# Patient Record
Sex: FEMALE | Race: WHITE | Employment: PART TIME | URBAN - METROPOLITAN AREA
[De-identification: names, ages, dates, MRNs, and addresses within clinical notes are randomized per-mention and may not be internally consistent; named-entity substitution may affect disease eponyms.]

---

## 2018-06-14 ENCOUNTER — HOSPITAL ENCOUNTER (EMERGENCY)
Age: 53
Discharge: HOME | End: 2018-06-15
Payer: COMMERCIAL

## 2018-06-14 DIAGNOSIS — S61.210A LACERATION OF RIGHT INDEX FINGER WITHOUT FOREIGN BODY WITHOUT DAMAGE TO NAIL, INITIAL ENCOUNTER: Primary | ICD-10-CM

## 2018-06-14 DIAGNOSIS — R51.9 NONINTRACTABLE HEADACHE, UNSPECIFIED CHRONICITY PATTERN, UNSPECIFIED HEADACHE TYPE: ICD-10-CM

## 2018-06-14 DIAGNOSIS — Z23 NEED FOR TDAP VACCINATION: ICD-10-CM

## 2018-06-14 DIAGNOSIS — I10 ESSENTIAL HYPERTENSION: ICD-10-CM

## 2018-06-14 LAB
ANION GAP SERPL CALCULATED.3IONS-SCNC: 13 MMOL/L (ref 7–16)
BUN BLDV-MCNC: 17 MG/DL (ref 6–20)
CALCIUM SERPL-MCNC: 8.9 MG/DL (ref 8.6–10.2)
CHLORIDE BLD-SCNC: 101 MMOL/L (ref 98–107)
CHP ED QC CHECK: YES
CO2: 23 MMOL/L (ref 22–29)
CREAT SERPL-MCNC: 0.9 MG/DL (ref 0.5–1)
GFR AFRICAN AMERICAN: >60
GFR NON-AFRICAN AMERICAN: >60 ML/MIN/1.73
GLUCOSE BLD-MCNC: 87 MG/DL (ref 74–109)
HCT VFR BLD CALC: 39.7 % (ref 34–48)
HEMOGLOBIN: 13.3 G/DL (ref 11.5–15.5)
MCH RBC QN AUTO: 31.4 PG (ref 26–35)
MCHC RBC AUTO-ENTMCNC: 33.5 % (ref 32–34.5)
MCV RBC AUTO: 93.6 FL (ref 80–99.9)
PDW BLD-RTO: 12.5 FL (ref 11.5–15)
PLATELET # BLD: 249 E9/L (ref 130–450)
PMV BLD AUTO: 11 FL (ref 7–12)
POTASSIUM SERPL-SCNC: 3.7 MMOL/L (ref 3.5–5)
PREGNANCY TEST URINE, POC: NEGATIVE
RBC # BLD: 4.24 E12/L (ref 3.5–5.5)
SODIUM BLD-SCNC: 137 MMOL/L (ref 132–146)
WBC # BLD: 7.7 E9/L (ref 4.5–11.5)

## 2018-06-14 PROCEDURE — 90715 TDAP VACCINE 7 YRS/> IM: CPT | Performed by: NURSE PRACTITIONER

## 2018-06-14 PROCEDURE — 6370000000 HC RX 637 (ALT 250 FOR IP): Performed by: NURSE PRACTITIONER

## 2018-06-14 PROCEDURE — 2500000003 HC RX 250 WO HCPCS: Performed by: NURSE PRACTITIONER

## 2018-06-14 PROCEDURE — 96374 THER/PROPH/DIAG INJ IV PUSH: CPT

## 2018-06-14 PROCEDURE — 85027 COMPLETE CBC AUTOMATED: CPT

## 2018-06-14 PROCEDURE — 90471 IMMUNIZATION ADMIN: CPT | Performed by: NURSE PRACTITIONER

## 2018-06-14 PROCEDURE — 12001 RPR S/N/AX/GEN/TRNK 2.5CM/<: CPT

## 2018-06-14 PROCEDURE — 80048 BASIC METABOLIC PNL TOTAL CA: CPT

## 2018-06-14 PROCEDURE — 99284 EMERGENCY DEPT VISIT MOD MDM: CPT

## 2018-06-14 PROCEDURE — 2580000003 HC RX 258: Performed by: NURSE PRACTITIONER

## 2018-06-14 PROCEDURE — 6360000002 HC RX W HCPCS: Performed by: NURSE PRACTITIONER

## 2018-06-14 RX ORDER — LIDOCAINE HYDROCHLORIDE 10 MG/ML
20 INJECTION, SOLUTION INFILTRATION; PERINEURAL ONCE
Status: COMPLETED | OUTPATIENT
Start: 2018-06-14 | End: 2018-06-14

## 2018-06-14 RX ORDER — METOPROLOL SUCCINATE 25 MG/1
25 TABLET, EXTENDED RELEASE ORAL DAILY
COMMUNITY
End: 2019-08-05

## 2018-06-14 RX ORDER — HYDRALAZINE HYDROCHLORIDE 20 MG/ML
10 INJECTION INTRAMUSCULAR; INTRAVENOUS ONCE
Status: COMPLETED | OUTPATIENT
Start: 2018-06-14 | End: 2018-06-14

## 2018-06-14 RX ORDER — LEVONORGESTREL / ETHINYL ESTRADIOL AND ETHINYL ESTRADIOL 150-30(84)
KIT ORAL
COMMUNITY
End: 2019-08-05

## 2018-06-14 RX ORDER — DIAPER,BRIEF,INFANT-TODD,DISP
EACH MISCELLANEOUS ONCE
Status: COMPLETED | OUTPATIENT
Start: 2018-06-14 | End: 2018-06-14

## 2018-06-14 RX ADMIN — HYDRALAZINE HYDROCHLORIDE 10 MG: 20 INJECTION INTRAMUSCULAR; INTRAVENOUS at 22:40

## 2018-06-14 RX ADMIN — BACITRACIN: 500 OINTMENT TOPICAL at 22:39

## 2018-06-14 RX ADMIN — LIDOCAINE HYDROCHLORIDE 20 ML: 10 INJECTION, SOLUTION INFILTRATION; PERINEURAL at 22:39

## 2018-06-14 RX ADMIN — TETANUS TOXOID, REDUCED DIPHTHERIA TOXOID AND ACELLULAR PERTUSSIS VACCINE, ADSORBED 0.5 ML: 5; 2.5; 8; 8; 2.5 SUSPENSION INTRAMUSCULAR at 22:40

## 2018-06-14 RX ADMIN — DEXTROSE AND SODIUM CHLORIDE 500 ML: 5; 900 INJECTION, SOLUTION INTRAVENOUS at 22:41

## 2018-06-14 ASSESSMENT — PAIN DESCRIPTION - FREQUENCY: FREQUENCY: CONTINUOUS

## 2018-06-14 ASSESSMENT — PAIN DESCRIPTION - PAIN TYPE: TYPE: ACUTE PAIN

## 2018-06-14 ASSESSMENT — PAIN DESCRIPTION - ORIENTATION: ORIENTATION: RIGHT

## 2018-06-14 ASSESSMENT — PAIN SCALES - GENERAL
PAINLEVEL_OUTOF10: 10
PAINLEVEL_OUTOF10: 10

## 2018-06-14 ASSESSMENT — PAIN DESCRIPTION - LOCATION: LOCATION: HEAD

## 2018-06-14 ASSESSMENT — PAIN DESCRIPTION - PROGRESSION: CLINICAL_PROGRESSION: GRADUALLY WORSENING

## 2018-06-14 ASSESSMENT — PAIN DESCRIPTION - DESCRIPTORS: DESCRIPTORS: ACHING

## 2018-06-14 ASSESSMENT — PAIN DESCRIPTION - ONSET: ONSET: GRADUAL

## 2018-06-15 VITALS
TEMPERATURE: 98.6 F | SYSTOLIC BLOOD PRESSURE: 140 MMHG | DIASTOLIC BLOOD PRESSURE: 80 MMHG | HEIGHT: 62 IN | RESPIRATION RATE: 14 BRPM | BODY MASS INDEX: 25.4 KG/M2 | OXYGEN SATURATION: 98 % | WEIGHT: 138 LBS | HEART RATE: 69 BPM

## 2018-06-15 NOTE — ED PROVIDER NOTES
[facility]           Department of Emergency Medicine   ED  Provider Note  Admit Date/Time: 2018  9:54 PM  ED Bed:   MRN: [id number]  Chief Complaint: Laceration (right index finger) and Headache (recently started on BP med, relays she can tell her BP has been high all day d/t HA)       History of Present Illness   Source of history provided by:  patient. History/Exam Limitations: none. Hugh Celaya is a 47 y.o. female who has a past medical history of:   Past Medical History:   Diagnosis Date    Acute porphyria ([address] 2006    intermittent    Endometriosis     Hypertension     presents to the ED by private car and is alone for laceration of the right index finger, beginning 8:40 PM while at work while a knife accidentally fell into the sink. She is left hand dominant and has no pain in finger. Her tetanus is not up to date. The patient additionally states she has a headache that is in the back of her head and is her typical headaches she gets when her blood pressure is high. This is not the 1st or worst headache she has had and no \"thunderclap\" sensation and had no previous work up and did have PCP evaluation. She states that sometimes the headache will last 3 days and Denies any photophobia, nausea, abdominal pain, blurred vision, double vision or any extremity weaknesses. Patient states that she follows for Dr. Noel Suarez  For a  history of porphyria and told her she should get D5% IV's when she gets the headaches. She denies any trauma to the head. ROS    Pertinent positives and negatives are stated within HPI, all other systems reviewed and are negative. Past Surgical History:   Procedure Laterality Date     SECTION, LOW TRANSVERSE      CYSTOSCOPY      ENDOMETRIAL ABLATION  2009    PELVIC LAPAROSCOPY  1998    endometrosis    PELVIC LAPAROSCOPY  , 2012    TUNNELED VENOUS PORT PLACEMENT  ,,   Social History:  reports that she has never smoked.  She however, inadvertent computerized transcription errors may be present.   END OF ED PROVIDER NOTE     Ken Vasquez, APRN - CNP  06/15/18 0008

## 2019-03-23 ENCOUNTER — HOSPITAL ENCOUNTER (EMERGENCY)
Age: 54
Discharge: HOME | End: 2019-03-23
Attending: EMERGENCY MEDICINE
Payer: COMMERCIAL

## 2019-03-23 ENCOUNTER — APPOINTMENT (OUTPATIENT)
Dept: CT IMAGING | Age: 54
End: 2019-03-23
Payer: COMMERCIAL

## 2019-03-23 VITALS
HEART RATE: 128 BPM | SYSTOLIC BLOOD PRESSURE: 159 MMHG | HEIGHT: 62 IN | TEMPERATURE: 98.2 F | BODY MASS INDEX: 26.5 KG/M2 | WEIGHT: 144 LBS | OXYGEN SATURATION: 98 % | DIASTOLIC BLOOD PRESSURE: 82 MMHG | RESPIRATION RATE: 18 BRPM

## 2019-03-23 DIAGNOSIS — R51.9 ACUTE NONINTRACTABLE HEADACHE, UNSPECIFIED HEADACHE TYPE: Primary | ICD-10-CM

## 2019-03-23 LAB
ALBUMIN SERPL-MCNC: 3.9 G/DL (ref 3.5–5.2)
ALP BLD-CCNC: 82 U/L (ref 35–104)
ALT SERPL-CCNC: 46 U/L (ref 0–32)
ANION GAP SERPL CALCULATED.3IONS-SCNC: 10 MMOL/L (ref 7–16)
ANION GAP SERPL CALCULATED.3IONS-SCNC: 13 MMOL/L (ref 7–16)
AST SERPL-CCNC: 31 U/L (ref 0–31)
BASOPHILS ABSOLUTE: 0.05 E9/L (ref 0–0.2)
BASOPHILS RELATIVE PERCENT: 0.3 % (ref 0–2)
BILIRUB SERPL-MCNC: 0.6 MG/DL (ref 0–1.2)
BUN BLDV-MCNC: 16 MG/DL (ref 6–20)
BUN BLDV-MCNC: 17 MG/DL (ref 6–20)
CALCIUM SERPL-MCNC: 8.8 MG/DL (ref 8.6–10.2)
CALCIUM SERPL-MCNC: 8.9 MG/DL (ref 8.6–10.2)
CHLORIDE BLD-SCNC: 103 MMOL/L (ref 98–107)
CHLORIDE BLD-SCNC: 104 MMOL/L (ref 98–107)
CO2: 23 MMOL/L (ref 22–29)
CO2: 27 MMOL/L (ref 22–29)
CREAT SERPL-MCNC: 0.7 MG/DL (ref 0.5–1)
CREAT SERPL-MCNC: 0.8 MG/DL (ref 0.5–1)
EOSINOPHILS ABSOLUTE: 0.11 E9/L (ref 0.05–0.5)
EOSINOPHILS RELATIVE PERCENT: 0.6 % (ref 0–6)
GFR AFRICAN AMERICAN: >60
GFR AFRICAN AMERICAN: >60
GFR NON-AFRICAN AMERICAN: >60 ML/MIN/1.73
GFR NON-AFRICAN AMERICAN: >60 ML/MIN/1.73
GLUCOSE BLD-MCNC: 93 MG/DL (ref 74–99)
GLUCOSE BLD-MCNC: 99 MG/DL (ref 74–99)
HCT VFR BLD CALC: 40.6 % (ref 34–48)
HEMOGLOBIN: 13.2 G/DL (ref 11.5–15.5)
IMMATURE GRANULOCYTES #: 0.16 E9/L
IMMATURE GRANULOCYTES %: 0.8 % (ref 0–5)
LYMPHOCYTES ABSOLUTE: 1.21 E9/L (ref 1.5–4)
LYMPHOCYTES RELATIVE PERCENT: 6.1 % (ref 20–42)
MCH RBC QN AUTO: 31.4 PG (ref 26–35)
MCHC RBC AUTO-ENTMCNC: 32.5 % (ref 32–34.5)
MCV RBC AUTO: 96.4 FL (ref 80–99.9)
MONOCYTES ABSOLUTE: 0.98 E9/L (ref 0.1–0.95)
MONOCYTES RELATIVE PERCENT: 4.9 % (ref 2–12)
NEUTROPHILS ABSOLUTE: 17.4 E9/L (ref 1.8–7.3)
NEUTROPHILS RELATIVE PERCENT: 87.3 % (ref 43–80)
PDW BLD-RTO: 13.2 FL (ref 11.5–15)
PLATELET # BLD: 186 E9/L (ref 130–450)
PMV BLD AUTO: 9.9 FL (ref 7–12)
POTASSIUM SERPL-SCNC: 4.3 MMOL/L (ref 3.5–5)
POTASSIUM SERPL-SCNC: 4.8 MMOL/L (ref 3.5–5)
RBC # BLD: 4.21 E12/L (ref 3.5–5.5)
REASON FOR REJECTION: NORMAL
REJECTED TEST: NORMAL
SODIUM BLD-SCNC: 139 MMOL/L (ref 132–146)
SODIUM BLD-SCNC: 141 MMOL/L (ref 132–146)
TOTAL PROTEIN: 6.6 G/DL (ref 6.4–8.3)
TROPONIN: <0.01 NG/ML (ref 0–0.03)
WBC # BLD: 19.9 E9/L (ref 4.5–11.5)

## 2019-03-23 PROCEDURE — 84484 ASSAY OF TROPONIN QUANT: CPT

## 2019-03-23 PROCEDURE — 80053 COMPREHEN METABOLIC PANEL: CPT

## 2019-03-23 PROCEDURE — 2580000003 HC RX 258: Performed by: EMERGENCY MEDICINE

## 2019-03-23 PROCEDURE — 85025 COMPLETE CBC W/AUTO DIFF WBC: CPT

## 2019-03-23 PROCEDURE — 99284 EMERGENCY DEPT VISIT MOD MDM: CPT

## 2019-03-23 PROCEDURE — 6360000002 HC RX W HCPCS: Performed by: EMERGENCY MEDICINE

## 2019-03-23 PROCEDURE — 70450 CT HEAD/BRAIN W/O DYE: CPT

## 2019-03-23 PROCEDURE — 80048 BASIC METABOLIC PNL TOTAL CA: CPT

## 2019-03-23 PROCEDURE — 96374 THER/PROPH/DIAG INJ IV PUSH: CPT

## 2019-03-23 PROCEDURE — 96375 TX/PRO/DX INJ NEW DRUG ADDON: CPT

## 2019-03-23 PROCEDURE — 36415 COLL VENOUS BLD VENIPUNCTURE: CPT

## 2019-03-23 RX ORDER — 0.9 % SODIUM CHLORIDE 0.9 %
500 INTRAVENOUS SOLUTION INTRAVENOUS ONCE
Status: COMPLETED | OUTPATIENT
Start: 2019-03-23 | End: 2019-03-23

## 2019-03-23 RX ORDER — ONDANSETRON 4 MG/1
4 TABLET, FILM COATED ORAL EVERY 8 HOURS PRN
Qty: 12 TABLET | Refills: 0 | Status: SHIPPED | OUTPATIENT
Start: 2019-03-23 | End: 2019-03-28

## 2019-03-23 RX ORDER — METOCLOPRAMIDE HYDROCHLORIDE 5 MG/ML
10 INJECTION INTRAMUSCULAR; INTRAVENOUS ONCE
Status: DISCONTINUED | OUTPATIENT
Start: 2019-03-23 | End: 2019-03-23

## 2019-03-23 RX ORDER — DIPHENHYDRAMINE HYDROCHLORIDE 50 MG/ML
25 INJECTION INTRAMUSCULAR; INTRAVENOUS ONCE
Status: COMPLETED | OUTPATIENT
Start: 2019-03-23 | End: 2019-03-23

## 2019-03-23 RX ORDER — KETOROLAC TROMETHAMINE 30 MG/ML
30 INJECTION, SOLUTION INTRAMUSCULAR; INTRAVENOUS ONCE
Status: COMPLETED | OUTPATIENT
Start: 2019-03-23 | End: 2019-03-23

## 2019-03-23 RX ORDER — HYDROCODONE BITARTRATE AND ACETAMINOPHEN 5; 325 MG/1; MG/1
1 TABLET ORAL EVERY 6 HOURS PRN
Qty: 12 TABLET | Refills: 0 | Status: SHIPPED | OUTPATIENT
Start: 2019-03-23 | End: 2019-03-25

## 2019-03-23 RX ADMIN — KETOROLAC TROMETHAMINE 30 MG: 30 INJECTION, SOLUTION INTRAMUSCULAR; INTRAVENOUS at 19:46

## 2019-03-23 RX ADMIN — PROCHLORPERAZINE EDISYLATE 10 MG: 5 INJECTION INTRAMUSCULAR; INTRAVENOUS at 19:45

## 2019-03-23 RX ADMIN — SODIUM CHLORIDE 500 ML: 9 INJECTION, SOLUTION INTRAVENOUS at 19:32

## 2019-03-23 RX ADMIN — DIPHENHYDRAMINE HYDROCHLORIDE 25 MG: 50 INJECTION, SOLUTION INTRAMUSCULAR; INTRAVENOUS at 19:32

## 2019-03-23 ASSESSMENT — PAIN - FUNCTIONAL ASSESSMENT
PAIN_FUNCTIONAL_ASSESSMENT: ACTIVITIES ARE NOT PREVENTED
PAIN_FUNCTIONAL_ASSESSMENT: 0-10

## 2019-03-23 ASSESSMENT — PAIN DESCRIPTION - LOCATION: LOCATION: HEAD

## 2019-03-23 ASSESSMENT — PAIN DESCRIPTION - PAIN TYPE: TYPE: ACUTE PAIN

## 2019-03-23 ASSESSMENT — PAIN SCALES - GENERAL
PAINLEVEL_OUTOF10: 10
PAINLEVEL_OUTOF10: 7
PAINLEVEL_OUTOF10: 4

## 2019-03-23 ASSESSMENT — PAIN DESCRIPTION - DESCRIPTORS: DESCRIPTORS: ACHING

## 2019-03-28 LAB
EKG ATRIAL RATE: 78 BPM
EKG P AXIS: 3 DEGREES
EKG P-R INTERVAL: 118 MS
EKG Q-T INTERVAL: 402 MS
EKG QRS DURATION: 74 MS
EKG QTC CALCULATION (BAZETT): 458 MS
EKG R AXIS: -20 DEGREES
EKG T AXIS: -7 DEGREES
EKG VENTRICULAR RATE: 78 BPM

## 2019-06-07 ENCOUNTER — APPOINTMENT (OUTPATIENT)
Dept: CT IMAGING | Age: 54
End: 2019-06-07
Payer: COMMERCIAL

## 2019-06-07 ENCOUNTER — HOSPITAL ENCOUNTER (EMERGENCY)
Age: 54
Discharge: HOME | End: 2019-06-08
Attending: EMERGENCY MEDICINE
Payer: COMMERCIAL

## 2019-06-07 VITALS
TEMPERATURE: 97.7 F | RESPIRATION RATE: 18 BRPM | OXYGEN SATURATION: 97 % | HEART RATE: 70 BPM | DIASTOLIC BLOOD PRESSURE: 73 MMHG | SYSTOLIC BLOOD PRESSURE: 168 MMHG

## 2019-06-07 DIAGNOSIS — N83.8 OVARIAN MASS, LEFT: ICD-10-CM

## 2019-06-07 DIAGNOSIS — M54.42 CHRONIC BILATERAL LOW BACK PAIN WITH LEFT-SIDED SCIATICA: Primary | ICD-10-CM

## 2019-06-07 DIAGNOSIS — G89.29 CHRONIC BILATERAL LOW BACK PAIN WITH LEFT-SIDED SCIATICA: Primary | ICD-10-CM

## 2019-06-07 DIAGNOSIS — G56.02 LEFT CARPAL TUNNEL SYNDROME: ICD-10-CM

## 2019-06-07 DIAGNOSIS — N83.202 LEFT OVARIAN CYST: ICD-10-CM

## 2019-06-07 PROCEDURE — 99283 EMERGENCY DEPT VISIT LOW MDM: CPT

## 2019-06-07 PROCEDURE — 72131 CT LUMBAR SPINE W/O DYE: CPT

## 2019-06-07 ASSESSMENT — PAIN SCALES - GENERAL: PAINLEVEL_OUTOF10: 10

## 2019-06-07 ASSESSMENT — PAIN DESCRIPTION - FREQUENCY: FREQUENCY: CONTINUOUS

## 2019-06-07 ASSESSMENT — PAIN DESCRIPTION - DESCRIPTORS: DESCRIPTORS: SHARP

## 2019-06-07 ASSESSMENT — PAIN DESCRIPTION - ORIENTATION: ORIENTATION: RIGHT

## 2019-06-07 ASSESSMENT — PAIN - FUNCTIONAL ASSESSMENT: PAIN_FUNCTIONAL_ASSESSMENT: INTOLERABLE, UNABLE TO DO ANY ACTIVE OR PASSIVE ACTIVITIES

## 2019-06-07 ASSESSMENT — PAIN DESCRIPTION - LOCATION: LOCATION: BACK;FLANK

## 2019-06-08 RX ORDER — OXYCODONE HYDROCHLORIDE AND ACETAMINOPHEN 5; 325 MG/1; MG/1
1 TABLET ORAL ONCE
Status: DISCONTINUED | OUTPATIENT
Start: 2019-06-08 | End: 2019-06-08 | Stop reason: HOSPADM

## 2019-06-08 RX ORDER — CYCLOBENZAPRINE HCL 5 MG
5 TABLET ORAL 3 TIMES DAILY PRN
Qty: 21 TABLET | Refills: 0 | Status: SHIPPED | OUTPATIENT
Start: 2019-06-08 | End: 2019-06-15

## 2019-06-08 ASSESSMENT — ENCOUNTER SYMPTOMS
BACK PAIN: 1
BOWEL INCONTINENCE: 0
ABDOMINAL PAIN: 0
ABDOMINAL SWELLING: 0

## 2019-06-08 NOTE — ED NOTES
Bed:  [place] [address]  Expected date:   Expected time:   Means of arrival:   Comments:  Tara Osorio RN  06/07/19 4002

## 2019-06-08 NOTE — ED PROVIDER NOTES
Patient is a 49years old female with history of ankylosing spondylitis and chronic low back pain here with sharp low back pain along with tingling in her left foot plus numbness in her left hand for the past 2 weeks. Patient states nothing makes her symptoms worse or better. Patient states she has had shots in her back in the past for similar problem. She denies falls, injuries, fever, chills, sweats, chest pain/pressure, shortness of breath, left arm/forearm pain, abdominal pain, nausea/vomiting, diarrhea, constipation, bloody stool or urine, burning with urination, urinary frequency changes, lightheadedness, loss of consciousness, change in vision/hearing, weakness, or history of aneurysms. She denies taking anything for discomfort. Back Pain   Location:  Lumbar spine  Quality: Sharp. Radiates to:  Does not radiate  Chronicity:  Recurrent  Relieved by:  Nothing  Worsened by:  Nothing  Ineffective treatments:  None tried  Associated symptoms: numbness and tingling    Associated symptoms: no abdominal pain, no abdominal swelling, no bladder incontinence, no bowel incontinence, no chest pain, no dysuria, no fever, no headaches and no weakness        Review of Systems   Constitutional: Negative for chills, diaphoresis and fever. HENT: Negative for ear pain, hearing loss, rhinorrhea, sinus pressure and sore throat. Eyes: Negative for pain, discharge, redness and visual disturbance. Respiratory: Negative for cough, shortness of breath and wheezing. Cardiovascular: Negative for chest pain. Gastrointestinal: Negative for abdominal distention, abdominal pain, blood in stool, bowel incontinence, constipation, diarrhea, nausea and vomiting. Genitourinary: Negative for bladder incontinence, dysuria, flank pain, frequency and hematuria. Musculoskeletal: Positive for back pain. Negative for arthralgias, neck pain and neck stiffness. Skin: Negative for rash and wound.    Neurological: Positive for tingling and numbness. Negative for dizziness, syncope, weakness, light-headedness and headaches. Hematological: Negative for adenopathy. All other systems reviewed and are negative. Physical Exam   Constitutional: She is oriented to person, place, and time. She appears well-developed and well-nourished. HENT:   Head: Normocephalic and atraumatic. Eyes: Pupils are equal, round, and reactive to light. Conjunctivae, EOM and lids are normal.   Neck: Trachea normal and normal range of motion. Neck supple. No JVD present. Cardiovascular: Normal rate and regular rhythm. Murmur (states she has hx of cardiac murmur) heard. Pulmonary/Chest: Effort normal and breath sounds normal. No respiratory distress. She has no wheezes. She has no rales. Abdominal: Soft. Bowel sounds are normal. There is no tenderness. There is no rebound, no guarding and no CVA tenderness. Musculoskeletal: She exhibits no edema. Lumbar back: She exhibits tenderness and pain. She exhibits no swelling and no deformity. Positive to left Tinel's sign. Positive left straight leg raise test.   Neurological: She is alert and oriented to person, place, and time. She has normal strength. No cranial nerve deficit. Coordination normal.   Skin: Skin is warm and dry. Nursing note and vitals reviewed. Procedures    MDM  Number of Diagnoses or Management Options  Chronic bilateral low back pain with left-sided sciatica:   Left carpal tunnel syndrome:   Left ovarian cyst:   Ovarian mass, left:   Diagnosis management comments: Patient's CT lumbar spine is unremarkable for acute L-spine changes. Patient is provided information regarding her condition. She is discharged in stable condition with instructions to follow-up with her neurosurgeon, rheumatologist, and PCP or return to ED for symptoms worsen.                 --------------------------------------------- PAST HISTORY ---------------------------------------------  Past Medical and counseling regarding the diagnosis and prognosis. Their questions are answered at this time and they are agreeable with the plan. I discussed at length with them reasons for immediate return here for re evaluation. They will followup with their Neurosurgeon and primary care physician by calling their office tomorrow. --------------------------------- ADDITIONAL PROVIDER NOTES ---------------------------------  At this time the patient is without objective evidence of an acute process requiring hospitalization or inpatient management. They have remained hemodynamically stable throughout their entire ED visit and are stable for discharge with outpatient follow-up. The plan has been discussed in detail and they are aware of the specific conditions for emergent return, as well as the importance of follow-up. New Prescriptions    CYCLOBENZAPRINE (FLEXERIL) 5 MG TABLET    Take 1 tablet by mouth 3 times daily as needed for Muscle spasms       Diagnosis:  1. Chronic bilateral low back pain with left-sided sciatica    2. Left ovarian cyst    3. Left carpal tunnel syndrome    4. Ovarian mass, left        Disposition:  Patient's disposition: Discharge to home  Patient's condition is stable.            Roddy Leong,   Resident  06/12/19 5791

## 2019-06-08 NOTE — ED NOTES
Pt to ed via ems. Co overall back pain with numbness left arm and left foot. Has been having pain over last several days with sudden increase today. Has history of scoliosis.      Brittany Sanchez RN  06/07/19 2482

## 2019-06-12 ASSESSMENT — ENCOUNTER SYMPTOMS
SINUS PRESSURE: 0
NAUSEA: 0
EYE DISCHARGE: 0
EYE PAIN: 0
VOMITING: 0
ABDOMINAL DISTENTION: 0
COUGH: 0
SHORTNESS OF BREATH: 0
CONSTIPATION: 0
SORE THROAT: 0
DIARRHEA: 0
WHEEZING: 0
EYE REDNESS: 0
RHINORRHEA: 0
BLOOD IN STOOL: 0

## 2019-06-27 ENCOUNTER — TELEPHONE (OUTPATIENT)
Dept: CARDIOLOGY CLINIC | Age: 54
End: 2019-06-27

## 2019-06-27 NOTE — TELEPHONE ENCOUNTER
Spoke with Shahana Morgan. Scheduled 8/05/19 (first available) & patient needs this so she can schedule with work.

## 2019-08-05 ENCOUNTER — OFFICE VISIT (OUTPATIENT)
Dept: CARDIOLOGY CLINIC | Age: 54
End: 2019-08-05
Payer: COMMERCIAL

## 2019-08-05 VITALS
HEART RATE: 61 BPM | RESPIRATION RATE: 16 BRPM | OXYGEN SATURATION: 99 % | SYSTOLIC BLOOD PRESSURE: 158 MMHG | HEIGHT: 61 IN | DIASTOLIC BLOOD PRESSURE: 88 MMHG | BODY MASS INDEX: 28.55 KG/M2 | WEIGHT: 151.2 LBS

## 2019-08-05 DIAGNOSIS — E80.20 PORPHYRIA, UNSPECIFIED PORPHYRIA TYPE (HCC): ICD-10-CM

## 2019-08-05 DIAGNOSIS — M45.9 ANKYLOSING SPONDYLITIS, UNSPECIFIED SITE OF SPINE (HCC): ICD-10-CM

## 2019-08-05 DIAGNOSIS — I27.20 PULMONARY HTN (HCC): Primary | ICD-10-CM

## 2019-08-05 DIAGNOSIS — I50.810 RIGHT-SIDED HEART FAILURE, UNSPECIFIED HF CHRONICITY (HCC): ICD-10-CM

## 2019-08-05 PROCEDURE — 94618 PULMONARY STRESS TESTING: CPT | Performed by: INTERNAL MEDICINE

## 2019-08-05 PROCEDURE — 93000 ELECTROCARDIOGRAM COMPLETE: CPT | Performed by: INTERNAL MEDICINE

## 2019-08-05 PROCEDURE — 99245 OFF/OP CONSLTJ NEW/EST HI 55: CPT | Performed by: INTERNAL MEDICINE

## 2019-08-05 PROCEDURE — G8427 DOCREV CUR MEDS BY ELIG CLIN: HCPCS | Performed by: INTERNAL MEDICINE

## 2019-08-05 PROCEDURE — G8419 CALC BMI OUT NRM PARAM NOF/U: HCPCS | Performed by: INTERNAL MEDICINE

## 2019-08-05 RX ORDER — AMLODIPINE BESYLATE 5 MG/1
5 TABLET ORAL DAILY
Qty: 60 TABLET | Refills: 3 | Status: SHIPPED | OUTPATIENT
Start: 2019-08-05

## 2019-08-05 RX ORDER — CANDESARTAN 8 MG/1
8 TABLET ORAL DAILY
COMMUNITY

## 2019-08-05 NOTE — PROGRESS NOTES
Comments   Systemic hypertension xx     Diabetes mellitus  xx    Metabolic syndrome:  Waist size,TGs,HDL,IFG,HTN  xx    CAD,CVD,PVD  xx    Thyroid disease: Hyperthyroidism; Hypothyroidism  xx    Arrhythmia   xx    Family history:  >2 relatives with DCM  SCD  xx    Recent viral illness  xx    HIV risk factors  xx    Alcohol use:  90 g EtOH > 5 yrs  Heavy drinking >10 yrs  xx    Performance enhancing drugs: ephedra,anabolic steroids, methamphetamines,methylphenidate, cocaine  xx    History cancer/chemotherapy/radiation: anthracyclines, dose and duration, iron-chelating agents; trastuzumab; cyclophosphamide; taxoids; 5FU; interferons; mitomycin-C  xx    Rheumatologic disease: pericarditis history; myocarditis history; chloroquine use;scleroderma/RA/SLE  xx    Pregnancy: GXPX  xx    Thiamine deficiency: anorexia nervosa,pregnancy,AIDS,alcoholism  xx    L-carnitine deficiency, skeletal myopathy  xx    Exposure to hypersensitivity inducing agents/presence of eosinophilia: sulfonamides, methyldopa, amphotericin B,streptomycin  xx    Travel to [facility] or [place]  xx    Joint replacement, exposure to cobalt  xx    Hemochromatosis risk factors:  Familial,DM/hepatomegaly,beta thalassemia/NE descent  xx    Pyschiatric history: clozapine use  xx    Sarcoidosis  xx    Amyloidosis  xx    Stress induced  xx    Acromegaly  xx              Past medical, surgical, family and social histories have been reviewed. Any changes in the past medical history, social history or family history have been made and are reflected in the electronic medical record. There are no active problems to display for this patient.           Past Medical History:   Diagnosis Date    Acute porphyria ([address] 2006    intermittent    Endometriosis     History of ankylosing spondylitis     Hypertension            Past Surgical History:   Procedure Laterality Date     SECTION, LOW TRANSVERSE      CYSTOSCOPY      ENDOMETRIAL ABLATION

## 2019-08-05 NOTE — PATIENT INSTRUCTIONS
1. Right heart catheterization  2. Pulmonary function testing [facility])  3. Echocardiogram   4. Blood work  5. High resolution CT chest  6.  pharmacological stress testing

## 2019-08-06 ENCOUNTER — TELEPHONE (OUTPATIENT)
Dept: CARDIOLOGY CLINIC | Age: 54
End: 2019-08-06

## 2019-08-06 NOTE — TELEPHONE ENCOUNTER
CT chest high resolution scheduled for 8/13/2019, patient to report to registration @ Entrance A in ER Parking lot, arrive @ 8:00 for 8:30 procedure. No prep. Patient is to be scheduled for an Echocardiogram, Pulmonary function testing and Right Heart catheterization on the same day as per Dr Olivarez Diver    Scheduled to report 8/29/2019 @ 7:45 am to registration 1st floor. She will have the echo on the 6th floor. Will have Pulmonary function testing @ 9:15 am, called Ext [phone] to discuss. No pulmonary meds 4 hours before. No caffeine 12 hours before. Right heart cath @ 10:30 in CVL   NPO after midnight.

## 2019-08-08 ENCOUNTER — TELEPHONE (OUTPATIENT)
Dept: CARDIOLOGY CLINIC | Age: 54
End: 2019-08-08

## 2019-08-13 ENCOUNTER — HOSPITAL ENCOUNTER (OUTPATIENT)
Dept: CT IMAGING | Age: 54
Discharge: HOME | End: 2019-08-15
Payer: COMMERCIAL

## 2019-08-13 DIAGNOSIS — I27.20 PULMONARY HTN (HCC): ICD-10-CM

## 2019-08-13 DIAGNOSIS — I50.810 RIGHT-SIDED HEART FAILURE, UNSPECIFIED HF CHRONICITY (HCC): ICD-10-CM

## 2019-08-13 DIAGNOSIS — E80.20 PORPHYRIA, UNSPECIFIED PORPHYRIA TYPE (HCC): ICD-10-CM

## 2019-08-13 DIAGNOSIS — M45.9 ANKYLOSING SPONDYLITIS, UNSPECIFIED SITE OF SPINE (HCC): ICD-10-CM

## 2019-08-13 PROCEDURE — 71250 CT THORAX DX C-: CPT

## 2019-08-27 ENCOUNTER — TELEPHONE (OUTPATIENT)
Dept: CARDIOLOGY | Age: 54
End: 2019-08-27

## 2019-08-27 ENCOUNTER — TELEPHONE (OUTPATIENT)
Dept: NON INVASIVE DIAGNOSTICS | Age: 54
End: 2019-08-27

## 2019-08-28 ENCOUNTER — HOSPITAL ENCOUNTER (OUTPATIENT)
Dept: CARDIOLOGY | Age: 54
Discharge: HOME | End: 2019-08-28
Payer: COMMERCIAL

## 2019-08-28 ENCOUNTER — TELEPHONE (OUTPATIENT)
Dept: CARDIAC CATH/INVASIVE PROCEDURES | Age: 54
End: 2019-08-28

## 2019-08-28 VITALS
OXYGEN SATURATION: 99 % | DIASTOLIC BLOOD PRESSURE: 70 MMHG | HEART RATE: 90 BPM | WEIGHT: 150 LBS | HEIGHT: 61 IN | BODY MASS INDEX: 28.32 KG/M2 | SYSTOLIC BLOOD PRESSURE: 132 MMHG

## 2019-08-28 DIAGNOSIS — M45.9 ANKYLOSING SPONDYLITIS, UNSPECIFIED SITE OF SPINE (HCC): ICD-10-CM

## 2019-08-28 DIAGNOSIS — I50.810 RIGHT-SIDED HEART FAILURE, UNSPECIFIED HF CHRONICITY (HCC): ICD-10-CM

## 2019-08-28 DIAGNOSIS — E80.20 PORPHYRIA, UNSPECIFIED PORPHYRIA TYPE (HCC): ICD-10-CM

## 2019-08-28 DIAGNOSIS — I27.20 PULMONARY HTN (HCC): ICD-10-CM

## 2019-08-28 PROCEDURE — 2580000003 HC RX 258: Performed by: INTERNAL MEDICINE

## 2019-08-28 PROCEDURE — 93018 CV STRESS TEST I&R ONLY: CPT | Performed by: INTERNAL MEDICINE

## 2019-08-28 PROCEDURE — 93016 CV STRESS TEST SUPVJ ONLY: CPT | Performed by: INTERNAL MEDICINE

## 2019-08-28 PROCEDURE — 6360000002 HC RX W HCPCS: Performed by: INTERNAL MEDICINE

## 2019-08-28 PROCEDURE — 93017 CV STRESS TEST TRACING ONLY: CPT

## 2019-08-28 PROCEDURE — 78452 HT MUSCLE IMAGE SPECT MULT: CPT

## 2019-08-28 PROCEDURE — A9502 TC99M TETROFOSMIN: HCPCS | Performed by: INTERNAL MEDICINE

## 2019-08-28 PROCEDURE — 3430000000 HC RX DIAGNOSTIC RADIOPHARMACEUTICAL: Performed by: INTERNAL MEDICINE

## 2019-08-28 RX ORDER — SODIUM CHLORIDE 0.9 % (FLUSH) 0.9 %
10 SYRINGE (ML) INJECTION PRN
Status: DISCONTINUED | OUTPATIENT
Start: 2019-08-28 | End: 2019-08-29 | Stop reason: HOSPADM

## 2019-08-28 RX ADMIN — Medication 10 ML: at 09:36

## 2019-08-28 RX ADMIN — REGADENOSON 0.4 MG: 0.08 INJECTION, SOLUTION INTRAVENOUS at 09:36

## 2019-08-28 RX ADMIN — TETROFOSMIN 8.6 MILLICURIE: 0.23 INJECTION, POWDER, LYOPHILIZED, FOR SOLUTION INTRAVENOUS at 07:51

## 2019-08-28 RX ADMIN — Medication 10 ML: at 07:51

## 2019-08-28 RX ADMIN — TETROFOSMIN 27.5 MILLICURIE: 0.23 INJECTION, POWDER, LYOPHILIZED, FOR SOLUTION INTRAVENOUS at 09:36

## 2019-08-28 NOTE — PROCEDURES
[address] [facility] and Vascular Lab - [address]. [place] [phone]               Pharmacologic Stress Nuclear Gated SPECT Study    Name: Kim Nelson Account [de-identified]    :  1965          Sex: female         Date of Study:  2019    Height: 5' 1\" (154.9 cm)         Weight: 150 lb (68 kg)     Ordering Provider: Reed Hoang          PCP: Holly Light MD      Cardiologist: none            Interpreting Physician: Iron Mccarthy  _________________________________________________________________________________    Indication:   Detecting the presence and location of coronary artery disease    Clinical History:   Patient has no known history of coronary artery disease. Resting ECG:    NSR    Procedure:   Pharmacologic stress testing was performed with regadenoson 0.4 mg for 15 seconds. The heart rate was 64 at baseline and renate to 124 beats during the infusion. The blood pressure at baseline was 130/80 and blood pressure at the end of infusion was 148/70. Blood pressure response was normal during the stress procedure. The patient tolerated the infusion well. ECG during the infusion did not change. IMAGING: Myocardial perfusion imaging was performed at rest 30-35 minutes following the intravenous injection of 8.6 mCi of (Tc-tetrofosmin) followed by 10 ml of Normal Saline. As per infusion protocol, the patient was injected intravenously with 27.5 mCi of (Tc-tetrofosmin) followed by 10 ml of Normal Saline. Gated post-stress tomographic imaging was performed 45 minutes after stress. FINDINGS: The overall quality of the study was good. Left ventricular cavity size was noted to be normal.    Rotational analog analysis demonstrated no patient motion or abnormal extracardiac radioactivity.     The gated SPECT stress imaging in the short, vertical long, and horizontal long axis demonstrated normal homogeneous tracer distribution throughout the myocardium both on the post regadenoson and resting images. Gated SPECT left ventricular ejection fraction was calculated to be 70%, with normal myocardial thickening and wall motion. Impression:    1. Electrocardiographically normal regadenoson infusion with a clinicallynonischemic response. 2. Myocardial perfusion imaging was normal.    3. Overall left ventricular systolic function was normal without regional wall motion abnormalities. EF 70%  4. Low risk general pharmacologic stress test    No recent study to compare. Thank you for sending your patient to this [employer].      Electronically signed by Sj Hernandez MD on 8/29/2019 at 10:05 AM    [id number]

## 2019-08-29 ENCOUNTER — HOSPITAL ENCOUNTER (OUTPATIENT)
Dept: CARDIAC CATH/INVASIVE PROCEDURES | Age: 54
Discharge: HOME | End: 2019-08-29
Payer: COMMERCIAL

## 2019-08-29 ENCOUNTER — HOSPITAL ENCOUNTER (OUTPATIENT)
Dept: PULMONOLOGY | Age: 54
Discharge: HOME | End: 2019-08-29
Payer: COMMERCIAL

## 2019-08-29 ENCOUNTER — HOSPITAL ENCOUNTER (OUTPATIENT)
Dept: NON INVASIVE DIAGNOSTICS | Age: 54
Discharge: HOME | End: 2019-08-29
Payer: COMMERCIAL

## 2019-08-29 VITALS — HEIGHT: 61 IN | BODY MASS INDEX: 28.32 KG/M2 | WEIGHT: 150 LBS

## 2019-08-29 DIAGNOSIS — I50.810 RIGHT-SIDED HEART FAILURE, UNSPECIFIED HF CHRONICITY (HCC): ICD-10-CM

## 2019-08-29 DIAGNOSIS — E80.20 PORPHYRIA, UNSPECIFIED PORPHYRIA TYPE (HCC): ICD-10-CM

## 2019-08-29 DIAGNOSIS — I27.20 PULMONARY HTN (HCC): ICD-10-CM

## 2019-08-29 DIAGNOSIS — R53.83 FATIGUE, UNSPECIFIED TYPE: ICD-10-CM

## 2019-08-29 DIAGNOSIS — M45.9 ANKYLOSING SPONDYLITIS, UNSPECIFIED SITE OF SPINE (HCC): ICD-10-CM

## 2019-08-29 DIAGNOSIS — R06.02 SHORTNESS OF BREATH: ICD-10-CM

## 2019-08-29 PROBLEM — R06.00 DYSPNEA: Status: ACTIVE | Noted: 2019-08-29

## 2019-08-29 LAB
ANION GAP SERPL CALCULATED.3IONS-SCNC: 8 MMOL/L (ref 7–16)
BUN BLDV-MCNC: 15 MG/DL (ref 6–20)
CALCIUM SERPL-MCNC: 9.3 MG/DL (ref 8.6–10.2)
CHLORIDE BLD-SCNC: 105 MMOL/L (ref 98–107)
CO2: 29 MMOL/L (ref 22–29)
CREAT SERPL-MCNC: 0.7 MG/DL (ref 0.5–1)
GFR AFRICAN AMERICAN: >60
GFR NON-AFRICAN AMERICAN: >60 ML/MIN/1.73
GLUCOSE BLD-MCNC: 101 MG/DL (ref 74–99)
HBA1C MFR BLD: 5.6 % (ref 4–5.6)
HCG QUALITATIVE: NEGATIVE
HCT VFR BLD CALC: 44.6 % (ref 34–48)
HEMOGLOBIN: 14.5 G/DL (ref 11.5–15.5)
LUPUS ANTICOAG DVVT: NORMAL
LV EF: 65 %
LVEF MODALITY: NORMAL
MCH RBC QN AUTO: 30.5 PG (ref 26–35)
MCHC RBC AUTO-ENTMCNC: 32.5 % (ref 32–34.5)
MCV RBC AUTO: 93.9 FL (ref 80–99.9)
PDW BLD-RTO: 12 FL (ref 11.5–15)
PLATELET # BLD: 231 E9/L (ref 130–450)
PMV BLD AUTO: 10.1 FL (ref 7–12)
POTASSIUM SERPL-SCNC: 4.1 MMOL/L (ref 3.5–5)
PRO-BNP: 100 PG/ML (ref 0–125)
PRO-BNP: 86 PG/ML (ref 0–125)
RBC # BLD: 4.75 E12/L (ref 3.5–5.5)
RHEUMATOID FACTOR: <10 IU/ML (ref 0–13)
SODIUM BLD-SCNC: 142 MMOL/L (ref 132–146)
TROPONIN: <0.01 NG/ML (ref 0–0.03)
TSH SERPL DL<=0.05 MIU/L-ACNC: 3.54 UIU/ML (ref 0.27–4.2)
WBC # BLD: 6 E9/L (ref 4.5–11.5)

## 2019-08-29 PROCEDURE — C1894 INTRO/SHEATH, NON-LASER: HCPCS

## 2019-08-29 PROCEDURE — 2580000003 HC RX 258: Performed by: INTERNAL MEDICINE

## 2019-08-29 PROCEDURE — 2500000003 HC RX 250 WO HCPCS

## 2019-08-29 PROCEDURE — 86038 ANTINUCLEAR ANTIBODIES: CPT

## 2019-08-29 PROCEDURE — 83880 ASSAY OF NATRIURETIC PEPTIDE: CPT

## 2019-08-29 PROCEDURE — 36415 COLL VENOUS BLD VENIPUNCTURE: CPT

## 2019-08-29 PROCEDURE — 84484 ASSAY OF TROPONIN QUANT: CPT

## 2019-08-29 PROCEDURE — 76937 US GUIDE VASCULAR ACCESS: CPT | Performed by: INTERNAL MEDICINE

## 2019-08-29 PROCEDURE — 94726 PLETHYSMOGRAPHY LUNG VOLUMES: CPT

## 2019-08-29 PROCEDURE — 6360000002 HC RX W HCPCS

## 2019-08-29 PROCEDURE — 94729 DIFFUSING CAPACITY: CPT

## 2019-08-29 PROCEDURE — 86431 RHEUMATOID FACTOR QUANT: CPT

## 2019-08-29 PROCEDURE — 86703 HIV-1/HIV-2 1 RESULT ANTBDY: CPT

## 2019-08-29 PROCEDURE — 93451 RIGHT HEART CATH: CPT | Performed by: INTERNAL MEDICINE

## 2019-08-29 PROCEDURE — 80048 BASIC METABOLIC PNL TOTAL CA: CPT

## 2019-08-29 PROCEDURE — 85027 COMPLETE CBC AUTOMATED: CPT

## 2019-08-29 PROCEDURE — 84443 ASSAY THYROID STIM HORMONE: CPT

## 2019-08-29 PROCEDURE — 94375 RESPIRATORY FLOW VOLUME LOOP: CPT

## 2019-08-29 PROCEDURE — 86235 NUCLEAR ANTIGEN ANTIBODY: CPT

## 2019-08-29 PROCEDURE — 83036 HEMOGLOBIN GLYCOSYLATED A1C: CPT

## 2019-08-29 PROCEDURE — 2709999900 HC NON-CHARGEABLE SUPPLY

## 2019-08-29 PROCEDURE — 85613 RUSSELL VIPER VENOM DILUTED: CPT

## 2019-08-29 PROCEDURE — 93306 TTE W/DOPPLER COMPLETE: CPT

## 2019-08-29 PROCEDURE — 84703 CHORIONIC GONADOTROPIN ASSAY: CPT

## 2019-08-29 PROCEDURE — C1751 CATH, INF, PER/CENT/MIDLINE: HCPCS

## 2019-08-29 PROCEDURE — 94060 EVALUATION OF WHEEZING: CPT

## 2019-08-29 RX ORDER — SODIUM CHLORIDE 9 MG/ML
INJECTION, SOLUTION INTRAVENOUS CONTINUOUS
Status: DISCONTINUED | OUTPATIENT
Start: 2019-08-29 | End: 2019-08-30 | Stop reason: HOSPADM

## 2019-08-29 RX ADMIN — SODIUM CHLORIDE: 9 INJECTION, SOLUTION INTRAVENOUS at 09:54

## 2019-08-29 NOTE — PROCEDURES
saturation  4. Low tracie (estimated) cardiac index and normal TD cardiac index  5. Uncontrolled systemic hypertension during testing  6. Elevated systemic vascular resistance    Recommendations:   1. No change in medications  2. Awaiting results of PFTs, TTE and stress test      Che Graham M.D.   Advanced Heart Failure and Pulmonary Hypertension  [place] [phone]

## 2019-08-30 LAB
ANTI JO-1 IGG: NEGATIVE
ANTI-NUCLEAR ANTIBODY (ANA): NEGATIVE
ENA TO SMITH (SM) ANTIBODY: NEGATIVE
HIV-1 AND HIV-2 ANTIBODIES: NORMAL
SCLERODERMA (SCL-70) AB: NEGATIVE

## 2019-09-05 ENCOUNTER — TELEPHONE (OUTPATIENT)
Dept: CARDIOLOGY CLINIC | Age: 54
End: 2019-09-05

## 2019-09-05 NOTE — TELEPHONE ENCOUNTER
----- Message from Susana Blount MD sent at 9/4/2019 11:15 PM EDT -----  Please let her know stress test and echo look ok. Have her return to the office in October. Thank you.

## 2019-09-06 NOTE — PROCEDURES
[address]                  [address] [facility],  [facility]                               PULMONARY FUNCTION    PATIENT NAME: Mounika Gerardo                     :        1965  MED REC NO:   [id number]                            ROOM:  ACCOUNT NO:   [de-identified]                           ADMIT DATE: 2019  PROVIDER:     Claudean Standing, MD    DATE OF PROCEDURE:  2019    FULL PFT WITH LUNG VOLUME AND DIFFUSION CAPACITY. SPIROMETRY:  FVC 2.60 which is 86 of predicted. FEV1 of 1.92 which is 79 of predicted. FEV1/FVC 74 which is 91 of predicted. MVV 48, which is 53 of predicted. LUNG VOLUMES:  Slow vital capacity 2.34 which is 80% of predicted. ERV 0.91 which is 105 of predicted. RV 1.89, which is 111 of predicted. TLC 4.23, which is 91 of predicted. RV/TLC 45, which is 120 of predicted. DLCO 21.66 which is 107 of predicted and corrects for alveolar volume to  121. IMPRESSION:  This is normal spirometry along with normal diffusion  capacity along with normal lung volume except mild air trapping which is  nonspecific. So, with this normal spirometry, normal lung volume,  normal diffusion capacity, clinical and radiological correlation  indicated and further workup to see if the shortness of breath is  related to lung disease.         Homero Hutson MD    D: 2019 9:30:32       T: 2019 10:53:04     [email or web address]  Job#: [id number]     Doc#: [id number]    CC:

## 2019-10-30 ENCOUNTER — OFFICE VISIT (OUTPATIENT)
Dept: CARDIOLOGY CLINIC | Age: 54
End: 2019-10-30
Payer: COMMERCIAL

## 2019-10-30 VITALS
DIASTOLIC BLOOD PRESSURE: 80 MMHG | HEIGHT: 61 IN | RESPIRATION RATE: 16 BRPM | HEART RATE: 73 BPM | WEIGHT: 145.6 LBS | TEMPERATURE: 98.1 F | OXYGEN SATURATION: 95 % | SYSTOLIC BLOOD PRESSURE: 124 MMHG | BODY MASS INDEX: 27.49 KG/M2

## 2019-10-30 DIAGNOSIS — I27.21 PAH (PULMONARY ARTERY HYPERTENSION) (HCC): ICD-10-CM

## 2019-10-30 DIAGNOSIS — J84.10 PULMONARY FIBROSIS (HCC): Primary | ICD-10-CM

## 2019-10-30 DIAGNOSIS — R06.02 SHORTNESS OF BREATH: ICD-10-CM

## 2019-10-30 PROCEDURE — 1036F TOBACCO NON-USER: CPT | Performed by: INTERNAL MEDICINE

## 2019-10-30 PROCEDURE — G8419 CALC BMI OUT NRM PARAM NOF/U: HCPCS | Performed by: INTERNAL MEDICINE

## 2019-10-30 PROCEDURE — 3017F COLORECTAL CA SCREEN DOC REV: CPT | Performed by: INTERNAL MEDICINE

## 2019-10-30 PROCEDURE — G8427 DOCREV CUR MEDS BY ELIG CLIN: HCPCS | Performed by: INTERNAL MEDICINE

## 2019-10-30 PROCEDURE — G8484 FLU IMMUNIZE NO ADMIN: HCPCS | Performed by: INTERNAL MEDICINE

## 2019-10-30 PROCEDURE — 99214 OFFICE O/P EST MOD 30 MIN: CPT | Performed by: INTERNAL MEDICINE

## 2019-11-01 PROCEDURE — 93000 ELECTROCARDIOGRAM COMPLETE: CPT | Performed by: INTERNAL MEDICINE

## 2019-11-13 ENCOUNTER — TELEPHONE (OUTPATIENT)
Dept: CARDIOLOGY CLINIC | Age: 54
End: 2019-11-13

## 2021-04-08 ENCOUNTER — HOSPITAL ENCOUNTER (OUTPATIENT)
Age: 56
Discharge: HOME | End: 2021-04-08
Payer: COMMERCIAL

## 2021-04-08 PROCEDURE — 84110 ASSAY OF PORPHOBILINOGEN: CPT

## 2021-04-08 PROCEDURE — 82657 ENZYME CELL ACTIVITY: CPT

## 2021-04-08 PROCEDURE — 84120 ASSAY OF URINE PORPHYRINS: CPT

## 2021-04-12 LAB
COPROPORPHYRIN I URINE (/MOL CRT): 2 UMOL/MOL CRT (ref 0–6)
COPROPORPHYRIN III URINE (/MOL CRT): 4 UMOL/MOL CRT (ref 0–14)
CREATININE 24 HOUR URINE: NORMAL MG/D (ref 500–1400)
CREATININE URINE: 117 MG/DL
HEPTACARBOXYLATE PORPHYRIN 24 HOUR URINE: 0 UMOL/MOL CRT (ref 0–2)
HOURS COLLECTED: 24
PGB/24HR: 7.5 UMOL/D (ref 0–11)
PGB/L: NORMAL UMOL/L (ref 0–8.8)
PORPHYRIN URINE INTERPRETATION: NEGATIVE
URINE TOTAL VOLUME: 1150
UROPORPHYRIN 24 HOUR URINE: 0 UMOL/MOL CRT (ref 0–4)

## 2021-04-14 LAB
Lab: NORMAL
REPORT: NORMAL
THIS TEST SENT TO: NORMAL

## 2021-04-19 LAB
Lab: NORMAL
REPORT: NORMAL
THIS TEST SENT TO: NORMAL

## 2021-04-23 ENCOUNTER — HOSPITAL ENCOUNTER (OUTPATIENT)
Age: 56
Discharge: HOME | End: 2021-04-23
Payer: COMMERCIAL

## 2021-04-23 PROCEDURE — 84120 ASSAY OF URINE PORPHYRINS: CPT

## 2021-04-23 PROCEDURE — 84311 SPECTROPHOTOMETRY: CPT

## 2021-04-23 PROCEDURE — 82135 ASSAY AMINOLEVULINIC ACID: CPT

## 2021-04-23 PROCEDURE — 84110 ASSAY OF PORPHOBILINOGEN: CPT

## 2021-04-26 LAB
AMINOLEVULINIC ACID 24 HOUR UR: NORMAL UMOL/D (ref 0–60)
AMINOLEVULINIC ACID URINE: 9 UMOL/L (ref 0–35)
PORPHYRIN INTERPRETATION: NEGATIVE
PORPHYRIN TOTAL: <10 NMOL/L (ref 0–15)

## 2021-04-27 LAB
COPROPORPHYRIN I URINE (/MOL CRT): 3 UMOL/MOL CRT (ref 0–6)
COPROPORPHYRIN III URINE (/MOL CRT): 7 UMOL/MOL CRT (ref 0–14)
CREATININE 24 HOUR URINE: NORMAL MG/D (ref 500–1400)
CREATININE URINE: 84 MG/DL
HEPTACARBOXYLATE PORPHYRIN 24 HOUR URINE: 1 UMOL/MOL CRT (ref 0–2)
HOURS COLLECTED: NORMAL
PGB/24HR: NORMAL UMOL/D (ref 0–11)
PGB/L: 4.6 UMOL/L (ref 0–8.8)
PORPHYRIN URINE INTERPRETATION: NEGATIVE
URINE TOTAL VOLUME: NORMAL
UROPORPHYRIN 24 HOUR URINE: 1 UMOL/MOL CRT (ref 0–4)

## 2021-11-20 LAB — SARS-COV-2: DETECTED

## 2021-11-25 ENCOUNTER — APPOINTMENT (OUTPATIENT)
Dept: GENERAL RADIOLOGY | Age: 56
DRG: 137 | End: 2021-11-25
Payer: COMMERCIAL

## 2021-11-25 ENCOUNTER — APPOINTMENT (OUTPATIENT)
Dept: CT IMAGING | Age: 56
DRG: 137 | End: 2021-11-25
Payer: COMMERCIAL

## 2021-11-25 ENCOUNTER — HOSPITAL ENCOUNTER (INPATIENT)
Age: 56
LOS: 3 days | Discharge: HOME | DRG: 137 | End: 2021-11-29
Attending: EMERGENCY MEDICINE | Admitting: INTERNAL MEDICINE
Payer: COMMERCIAL

## 2021-11-25 DIAGNOSIS — U07.1 PNEUMONIA DUE TO COVID-19 VIRUS: Primary | ICD-10-CM

## 2021-11-25 DIAGNOSIS — J12.82 PNEUMONIA DUE TO COVID-19 VIRUS: Primary | ICD-10-CM

## 2021-11-25 DIAGNOSIS — J96.01 ACUTE RESPIRATORY FAILURE WITH HYPOXIA (HCC): ICD-10-CM

## 2021-11-25 LAB
ALBUMIN SERPL-MCNC: 3.5 G/DL (ref 3.5–5.2)
ALP BLD-CCNC: 78 U/L (ref 35–104)
ALT SERPL-CCNC: 26 U/L (ref 0–32)
ANION GAP SERPL CALCULATED.3IONS-SCNC: 11 MMOL/L (ref 7–16)
AST SERPL-CCNC: 33 U/L (ref 0–31)
ATYPICAL LYMPHOCYTE RELATIVE PERCENT: 0.9 % (ref 0–4)
BASOPHILS ABSOLUTE: 0 E9/L (ref 0–0.2)
BASOPHILS RELATIVE PERCENT: 0 % (ref 0–2)
BILIRUB SERPL-MCNC: 0.5 MG/DL (ref 0–1.2)
BUN BLDV-MCNC: 8 MG/DL (ref 6–20)
BURR CELLS: ABNORMAL
CALCIUM SERPL-MCNC: 8.6 MG/DL (ref 8.6–10.2)
CHLORIDE BLD-SCNC: 102 MMOL/L (ref 98–107)
CO2: 26 MMOL/L (ref 22–29)
CREAT SERPL-MCNC: 0.7 MG/DL (ref 0.5–1)
D DIMER: 628 NG/ML DDU
EOSINOPHILS ABSOLUTE: 0 E9/L (ref 0.05–0.5)
EOSINOPHILS RELATIVE PERCENT: 0 % (ref 0–6)
GFR AFRICAN AMERICAN: >60
GFR NON-AFRICAN AMERICAN: >60 ML/MIN/1.73
GLUCOSE BLD-MCNC: 111 MG/DL (ref 74–99)
HCT VFR BLD CALC: 44.1 % (ref 34–48)
HEMOGLOBIN: 14.5 G/DL (ref 11.5–15.5)
LACTATE DEHYDROGENASE: 438 U/L (ref 135–214)
LYMPHOCYTES ABSOLUTE: 0.41 E9/L (ref 1.5–4)
LYMPHOCYTES RELATIVE PERCENT: 11.3 % (ref 20–42)
MCH RBC QN AUTO: 30 PG (ref 26–35)
MCHC RBC AUTO-ENTMCNC: 32.9 % (ref 32–34.5)
MCV RBC AUTO: 91.3 FL (ref 80–99.9)
MONOCYTES ABSOLUTE: 0.2 E9/L (ref 0.1–0.95)
MONOCYTES RELATIVE PERCENT: 6.1 % (ref 2–12)
NEUTROPHILS ABSOLUTE: 2.79 E9/L (ref 1.8–7.3)
NEUTROPHILS RELATIVE PERCENT: 81.7 % (ref 43–80)
NUCLEATED RED BLOOD CELLS: 0 /100 WBC
PDW BLD-RTO: 12.6 FL (ref 11.5–15)
PLATELET # BLD: 132 E9/L (ref 130–450)
PMV BLD AUTO: 10.8 FL (ref 7–12)
POIKILOCYTES: ABNORMAL
POLYCHROMASIA: ABNORMAL
POTASSIUM REFLEX MAGNESIUM: 3.6 MMOL/L (ref 3.5–5)
PRO-BNP: 94 PG/ML (ref 0–125)
RBC # BLD: 4.83 E12/L (ref 3.5–5.5)
SODIUM BLD-SCNC: 139 MMOL/L (ref 132–146)
TOTAL PROTEIN: 6.9 G/DL (ref 6.4–8.3)
TROPONIN, HIGH SENSITIVITY: 13 NG/L (ref 0–9)
TROPONIN, HIGH SENSITIVITY: 13 NG/L (ref 0–9)
WBC # BLD: 3.4 E9/L (ref 4.5–11.5)

## 2021-11-25 PROCEDURE — 93005 ELECTROCARDIOGRAM TRACING: CPT | Performed by: EMERGENCY MEDICINE

## 2021-11-25 PROCEDURE — 6360000002 HC RX W HCPCS: Performed by: EMERGENCY MEDICINE

## 2021-11-25 PROCEDURE — 80053 COMPREHEN METABOLIC PANEL: CPT

## 2021-11-25 PROCEDURE — 84484 ASSAY OF TROPONIN QUANT: CPT

## 2021-11-25 PROCEDURE — 6360000004 HC RX CONTRAST MEDICATION: Performed by: RADIOLOGY

## 2021-11-25 PROCEDURE — 99285 EMERGENCY DEPT VISIT HI MDM: CPT

## 2021-11-25 PROCEDURE — 96374 THER/PROPH/DIAG INJ IV PUSH: CPT

## 2021-11-25 PROCEDURE — 71275 CT ANGIOGRAPHY CHEST: CPT

## 2021-11-25 PROCEDURE — 85025 COMPLETE CBC W/AUTO DIFF WBC: CPT

## 2021-11-25 PROCEDURE — 82728 ASSAY OF FERRITIN: CPT

## 2021-11-25 PROCEDURE — 83880 ASSAY OF NATRIURETIC PEPTIDE: CPT

## 2021-11-25 PROCEDURE — 85378 FIBRIN DEGRADE SEMIQUANT: CPT

## 2021-11-25 PROCEDURE — 86140 C-REACTIVE PROTEIN: CPT

## 2021-11-25 PROCEDURE — 6370000000 HC RX 637 (ALT 250 FOR IP): Performed by: EMERGENCY MEDICINE

## 2021-11-25 PROCEDURE — 2580000003 HC RX 258: Performed by: EMERGENCY MEDICINE

## 2021-11-25 PROCEDURE — 71045 X-RAY EXAM CHEST 1 VIEW: CPT

## 2021-11-25 PROCEDURE — 83615 LACTATE (LD) (LDH) ENZYME: CPT

## 2021-11-25 RX ORDER — ONDANSETRON 2 MG/ML
4 INJECTION INTRAMUSCULAR; INTRAVENOUS ONCE
Status: COMPLETED | OUTPATIENT
Start: 2021-11-25 | End: 2021-11-25

## 2021-11-25 RX ORDER — 0.9 % SODIUM CHLORIDE 0.9 %
1000 INTRAVENOUS SOLUTION INTRAVENOUS ONCE
Status: COMPLETED | OUTPATIENT
Start: 2021-11-25 | End: 2021-11-25

## 2021-11-25 RX ORDER — ACETAMINOPHEN 500 MG
1000 TABLET ORAL ONCE
Status: COMPLETED | OUTPATIENT
Start: 2021-11-25 | End: 2021-11-25

## 2021-11-25 RX ADMIN — ONDANSETRON 4 MG: 2 INJECTION INTRAMUSCULAR; INTRAVENOUS at 21:19

## 2021-11-25 RX ADMIN — IOPAMIDOL 75 ML: 755 INJECTION, SOLUTION INTRAVENOUS at 22:59

## 2021-11-25 RX ADMIN — SODIUM CHLORIDE 1000 ML: 9 INJECTION, SOLUTION INTRAVENOUS at 21:18

## 2021-11-25 RX ADMIN — ACETAMINOPHEN 1000 MG: 500 TABLET ORAL at 21:19

## 2021-11-25 ASSESSMENT — PAIN SCALES - GENERAL: PAINLEVEL_OUTOF10: 0

## 2021-11-26 PROBLEM — U07.1 PNEUMONIA DUE TO COVID-19 VIRUS: Status: ACTIVE | Noted: 2021-11-26

## 2021-11-26 PROBLEM — J12.82 PNEUMONIA DUE TO COVID-19 VIRUS: Status: ACTIVE | Noted: 2021-11-26

## 2021-11-26 LAB
ALBUMIN SERPL-MCNC: 3.4 G/DL (ref 3.5–5.2)
ALP BLD-CCNC: 75 U/L (ref 35–104)
ALT SERPL-CCNC: 24 U/L (ref 0–32)
ANION GAP SERPL CALCULATED.3IONS-SCNC: 10 MMOL/L (ref 7–16)
AST SERPL-CCNC: 31 U/L (ref 0–31)
ATYPICAL LYMPHOCYTE RELATIVE PERCENT: 7 % (ref 0–4)
BASOPHILS ABSOLUTE: 0 E9/L (ref 0–0.2)
BASOPHILS RELATIVE PERCENT: 0 % (ref 0–2)
BILIRUB SERPL-MCNC: 0.4 MG/DL (ref 0–1.2)
BUN BLDV-MCNC: 10 MG/DL (ref 6–20)
BURR CELLS: ABNORMAL
C-REACTIVE PROTEIN: 7.2 MG/DL (ref 0–0.4)
CALCIUM SERPL-MCNC: 8.3 MG/DL (ref 8.6–10.2)
CHLORIDE BLD-SCNC: 106 MMOL/L (ref 98–107)
CO2: 25 MMOL/L (ref 22–29)
CREAT SERPL-MCNC: 0.7 MG/DL (ref 0.5–1)
EKG ATRIAL RATE: 99 BPM
EKG P AXIS: 48 DEGREES
EKG P-R INTERVAL: 146 MS
EKG Q-T INTERVAL: 350 MS
EKG QRS DURATION: 76 MS
EKG QTC CALCULATION (BAZETT): 449 MS
EKG R AXIS: -25 DEGREES
EKG T AXIS: 15 DEGREES
EKG VENTRICULAR RATE: 99 BPM
EOSINOPHILS ABSOLUTE: 0 E9/L (ref 0.05–0.5)
EOSINOPHILS RELATIVE PERCENT: 0 % (ref 0–6)
FERRITIN: 808 NG/ML
GFR AFRICAN AMERICAN: >60
GFR NON-AFRICAN AMERICAN: >60 ML/MIN/1.73
GLUCOSE BLD-MCNC: 123 MG/DL (ref 74–99)
HCT VFR BLD CALC: 41.2 % (ref 34–48)
HEMOGLOBIN: 13.3 G/DL (ref 11.5–15.5)
LYMPHOCYTES ABSOLUTE: 0.4 E9/L (ref 1.5–4)
LYMPHOCYTES RELATIVE PERCENT: 14 % (ref 20–42)
MCH RBC QN AUTO: 30 PG (ref 26–35)
MCHC RBC AUTO-ENTMCNC: 32.3 % (ref 32–34.5)
MCV RBC AUTO: 93 FL (ref 80–99.9)
METAMYELOCYTES RELATIVE PERCENT: 1 % (ref 0–1)
MONOCYTES ABSOLUTE: 0.04 E9/L (ref 0.1–0.95)
MONOCYTES RELATIVE PERCENT: 2 % (ref 2–12)
MYELOCYTE PERCENT: 1 % (ref 0–0)
NEUTROPHILS ABSOLUTE: 1.46 E9/L (ref 1.8–7.3)
NEUTROPHILS RELATIVE PERCENT: 75 % (ref 43–80)
PDW BLD-RTO: 12.8 FL (ref 11.5–15)
PLATELET # BLD: 144 E9/L (ref 130–450)
PMV BLD AUTO: 11.3 FL (ref 7–12)
POIKILOCYTES: ABNORMAL
POTASSIUM REFLEX MAGNESIUM: 3.9 MMOL/L (ref 3.5–5)
RBC # BLD: 4.43 E12/L (ref 3.5–5.5)
SODIUM BLD-SCNC: 141 MMOL/L (ref 132–146)
TOTAL PROTEIN: 6.6 G/DL (ref 6.4–8.3)
WBC # BLD: 1.9 E9/L (ref 4.5–11.5)

## 2021-11-26 PROCEDURE — 2580000003 HC RX 258: Performed by: INTERNAL MEDICINE

## 2021-11-26 PROCEDURE — 6360000002 HC RX W HCPCS: Performed by: INTERNAL MEDICINE

## 2021-11-26 PROCEDURE — 99223 1ST HOSP IP/OBS HIGH 75: CPT | Performed by: INTERNAL MEDICINE

## 2021-11-26 PROCEDURE — 6370000000 HC RX 637 (ALT 250 FOR IP): Performed by: INTERNAL MEDICINE

## 2021-11-26 PROCEDURE — 85025 COMPLETE CBC W/AUTO DIFF WBC: CPT

## 2021-11-26 PROCEDURE — XW0DXM6 INTRODUCTION OF BARICITINIB INTO MOUTH AND PHARYNX, EXTERNAL APPROACH, NEW TECHNOLOGY GROUP 6: ICD-10-PCS | Performed by: INTERNAL MEDICINE

## 2021-11-26 PROCEDURE — 36415 COLL VENOUS BLD VENIPUNCTURE: CPT

## 2021-11-26 PROCEDURE — 2060000000 HC ICU INTERMEDIATE R&B

## 2021-11-26 PROCEDURE — 80053 COMPREHEN METABOLIC PANEL: CPT

## 2021-11-26 PROCEDURE — 6360000002 HC RX W HCPCS: Performed by: EMERGENCY MEDICINE

## 2021-11-26 RX ORDER — DEXAMETHASONE SODIUM PHOSPHATE 10 MG/ML
10 INJECTION INTRAMUSCULAR; INTRAVENOUS ONCE
Status: COMPLETED | OUTPATIENT
Start: 2021-11-26 | End: 2021-11-26

## 2021-11-26 RX ORDER — DEXAMETHASONE SODIUM PHOSPHATE 10 MG/ML
6 INJECTION INTRAMUSCULAR; INTRAVENOUS EVERY 24 HOURS
Status: DISCONTINUED | OUTPATIENT
Start: 2021-11-27 | End: 2021-11-29 | Stop reason: SDUPTHER

## 2021-11-26 RX ORDER — VITAMIN B COMPLEX
1000 TABLET ORAL DAILY
Status: DISCONTINUED | OUTPATIENT
Start: 2021-11-26 | End: 2021-11-29 | Stop reason: HOSPADM

## 2021-11-26 RX ORDER — POTASSIUM CHLORIDE 7.45 MG/ML
10 INJECTION INTRAVENOUS PRN
Status: DISCONTINUED | OUTPATIENT
Start: 2021-11-26 | End: 2021-11-29 | Stop reason: HOSPADM

## 2021-11-26 RX ORDER — ONDANSETRON 2 MG/ML
4 INJECTION INTRAMUSCULAR; INTRAVENOUS EVERY 6 HOURS PRN
Status: DISCONTINUED | OUTPATIENT
Start: 2021-11-26 | End: 2021-11-29 | Stop reason: HOSPADM

## 2021-11-26 RX ORDER — DEXAMETHASONE SODIUM PHOSPHATE 10 MG/ML
6 INJECTION INTRAMUSCULAR; INTRAVENOUS EVERY 24 HOURS
Status: DISCONTINUED | OUTPATIENT
Start: 2021-11-26 | End: 2021-11-26 | Stop reason: CLARIF

## 2021-11-26 RX ORDER — AMLODIPINE BESYLATE 5 MG/1
5 TABLET ORAL DAILY
Status: DISCONTINUED | OUTPATIENT
Start: 2021-11-26 | End: 2021-11-29 | Stop reason: HOSPADM

## 2021-11-26 RX ORDER — ACETAMINOPHEN 325 MG/1
650 TABLET ORAL EVERY 6 HOURS PRN
Status: DISCONTINUED | OUTPATIENT
Start: 2021-11-26 | End: 2021-11-29 | Stop reason: HOSPADM

## 2021-11-26 RX ORDER — ONDANSETRON 4 MG/1
4 TABLET, ORALLY DISINTEGRATING ORAL EVERY 8 HOURS PRN
Status: DISCONTINUED | OUTPATIENT
Start: 2021-11-26 | End: 2021-11-29 | Stop reason: HOSPADM

## 2021-11-26 RX ORDER — SODIUM CHLORIDE 0.9 % (FLUSH) 0.9 %
5-40 SYRINGE (ML) INJECTION EVERY 12 HOURS SCHEDULED
Status: DISCONTINUED | OUTPATIENT
Start: 2021-11-26 | End: 2021-11-29 | Stop reason: HOSPADM

## 2021-11-26 RX ORDER — MAGNESIUM SULFATE IN WATER 40 MG/ML
2000 INJECTION, SOLUTION INTRAVENOUS PRN
Status: DISCONTINUED | OUTPATIENT
Start: 2021-11-26 | End: 2021-11-29 | Stop reason: HOSPADM

## 2021-11-26 RX ORDER — POLYETHYLENE GLYCOL 3350 17 G/17G
17 POWDER, FOR SOLUTION ORAL DAILY PRN
Status: DISCONTINUED | OUTPATIENT
Start: 2021-11-26 | End: 2021-11-29 | Stop reason: HOSPADM

## 2021-11-26 RX ORDER — ACETAMINOPHEN 650 MG/1
650 SUPPOSITORY RECTAL EVERY 6 HOURS PRN
Status: DISCONTINUED | OUTPATIENT
Start: 2021-11-26 | End: 2021-11-29 | Stop reason: HOSPADM

## 2021-11-26 RX ORDER — SODIUM CHLORIDE 9 MG/ML
25 INJECTION, SOLUTION INTRAVENOUS PRN
Status: DISCONTINUED | OUTPATIENT
Start: 2021-11-26 | End: 2021-11-29 | Stop reason: HOSPADM

## 2021-11-26 RX ORDER — SODIUM CHLORIDE 0.9 % (FLUSH) 0.9 %
5-40 SYRINGE (ML) INJECTION PRN
Status: DISCONTINUED | OUTPATIENT
Start: 2021-11-26 | End: 2021-11-29 | Stop reason: HOSPADM

## 2021-11-26 RX ORDER — SODIUM CHLORIDE 9 MG/ML
INJECTION, SOLUTION INTRAVENOUS CONTINUOUS
Status: ACTIVE | OUTPATIENT
Start: 2021-11-26 | End: 2021-11-27

## 2021-11-26 RX ADMIN — Medication 10 ML: at 22:38

## 2021-11-26 RX ADMIN — ENOXAPARIN SODIUM 40 MG: 100 INJECTION SUBCUTANEOUS at 08:34

## 2021-11-26 RX ADMIN — BARICITINIB 4 MG: 2 TABLET, FILM COATED ORAL at 03:14

## 2021-11-26 RX ADMIN — SODIUM CHLORIDE: 9 INJECTION, SOLUTION INTRAVENOUS at 03:12

## 2021-11-26 RX ADMIN — Medication 10 ML: at 08:35

## 2021-11-26 RX ADMIN — DEXAMETHASONE SODIUM PHOSPHATE 10 MG: 10 INJECTION INTRAMUSCULAR; INTRAVENOUS at 03:12

## 2021-11-26 RX ADMIN — Medication 1000 UNITS: at 08:34

## 2021-11-26 RX ADMIN — AMLODIPINE BESYLATE 5 MG: 5 TABLET ORAL at 08:50

## 2021-11-26 ASSESSMENT — PAIN SCALES - GENERAL
PAINLEVEL_OUTOF10: 0

## 2021-11-26 NOTE — ED PROVIDER NOTES
HPI:  21, Time: 8:50 PM JUANPABLO Villalba is a 55 y.o. female presenting to the ED for cough beginning 10 days ago. Symptoms have been moderate in severity, intermittent, worsening, no exacerbating or alleviating factors. States that she has had some blood-tinged sputum. No history of DVT/PE. No anticoagulation use. She reports associated symptoms of fevers, chest pain, difficulty breathing, nausea, and diarrhea. She states that she recently tested positive for COVID-19 as an outpatient. She has not been vaccinated for COVID-19. She denies abdominal pain and emesis. She is a history of COPD. She does not require baseline oxygen. Review of Systems:   Pertinent positives and negatives are stated within HPI, all other systems reviewed and are negative.          --------------------------------------------- PAST HISTORY ---------------------------------------------  Past Medical History:  has a past medical history of Acute porphyria ([address] Endometriosis, History of ankylosing spondylitis, and Hypertension. Past Surgical History:  has a past surgical history that includes pelvic laparoscopy ();  section, low transverse (); pelvic laparoscopy (, ); Tunneled venous port placement (,,); Endometrial ablation (); Cystocopy (); and Cardiac catheterization (2019). Social History:  reports that she has never smoked. She has never used smokeless tobacco. She reports that she does not drink alcohol and does not use drugs. Family History: family history includes Heart Defect in her father; Heart Failure in her mother; Heart Surgery in her father; High Blood Pressure in her brother; Hypertension in her father; Other (age of onset: 50) in her sister. The patients home medications have been reviewed. Allergies:  Avelox [moxifloxacin], Bactrim [sulfamethoxazole-trimethoprim], Diphenoxylate-atropine, Zithromax [azithromycin], Biaxin [clarithromycin], Clindamycin/lincomycin, Doxycycline, Penicillins, and Tetracyclines & related    -------------------------------------------------- RESULTS -------------------------------------------------  All laboratory and radiology results have been personally reviewed by myself   LABS:  Results for orders placed or performed during the hospital encounter of 11/25/21   CBC Auto Differential   Result Value Ref Range    WBC 3.4 (L) 4.5 - 11.5 E9/L    RBC 4.83 3.50 - 5.50 E12/L    Hemoglobin 14.5 11.5 - 15.5 g/dL    Hematocrit 44.1 34.0 - 48.0 %    MCV 91.3 80.0 - 99.9 fL    MCH 30.0 26.0 - 35.0 pg    MCHC 32.9 32.0 - 34.5 %    RDW 12.6 11.5 - 15.0 fL    Platelets 926 751 - 714 E9/L    MPV 10.8 7.0 - 12.0 fL    Neutrophils % 81.7 (H) 43.0 - 80.0 %    Lymphocytes % 11.3 (L) 20.0 - 42.0 %    Monocytes % 6.1 2.0 - 12.0 %    Eosinophils % 0.0 0.0 - 6.0 %    Basophils % 0.0 0.0 - 2.0 %    Neutrophils Absolute 2.79 1.80 - 7.30 E9/L    Lymphocytes Absolute 0.41 (L) 1.50 - 4.00 E9/L    Monocytes Absolute 0.20 0.10 - 0.95 E9/L    Eosinophils Absolute 0.00 (L) 0.05 - 0.50 E9/L    Basophils Absolute 0.00 0.00 - 0.20 E9/L    Atypical Lymphocytes Relative 0.9 0.0 - 4.0 %    nRBC 0.0 /100 WBC    Polychromasia 1+     Poikilocytes 1+     Cascade Cells 1+    Comprehensive Metabolic Panel w/ Reflex to MG   Result Value Ref Range    Sodium 139 132 - 146 mmol/L    Potassium reflex Magnesium 3.6 3.5 - 5.0 mmol/L    Chloride 102 98 - 107 mmol/L    CO2 26 22 - 29 mmol/L    Anion Gap 11 7 - 16 mmol/L    Glucose 111 (H) 74 - 99 mg/dL    BUN 8 6 - 20 mg/dL    CREATININE 0.7 0.5 - 1.0 mg/dL    GFR Non-African American >60 >=60 mL/min/1.73    GFR African American >60     Calcium 8.6 8.6 - 10.2 mg/dL    Total Protein 6.9 6.4 - 8.3 g/dL    Albumin 3.5 3.5 - 5.2 g/dL    Total Bilirubin 0.5 0.0 - 1.2 mg/dL    Alkaline Phosphatase 78 35 - 104 U/L    ALT 26 0 - 32 U/L    AST 33 (H) 0 - 31 U/L   Troponin   Result Value Ref Range    Troponin, High Sensitivity 13 (H) 0 - 9 ng/L   Brain Natriuretic Peptide   Result Value Ref Range    Pro-BNP 94 0 - 125 pg/mL   D-Dimer, Quantitative   Result Value Ref Range    D-Dimer, Quant 628 ng/mL DDU   LACTATE DEHYDROGENASE   Result Value Ref Range     (H) 135 - 214 U/L   Troponin   Result Value Ref Range    Troponin, High Sensitivity 13 (H) 0 - 9 ng/L   EKG 12 Lead   Result Value Ref Range    Ventricular Rate 99 BPM    Atrial Rate 99 BPM    P-R Interval 146 ms    QRS Duration 76 ms    Q-T Interval 350 ms    QTc Calculation (Bazett) 449 ms    P Axis 48 degrees    R Axis -25 degrees    T Axis 15 degrees       RADIOLOGY:  Interpreted by Radiologist.  CTA PULMONARY W CONTRAST   Final Result   No evidence of pulmonary embolism. Bilateral multifocal infiltrates as described, compatible with multifocal   pneumonia, most likely COVID pneumonia. Relatively localized lobulated right retroareolar soft tissue most likely   represents normal stromal/glandular tissue, with the left retroareolar region   partially excluded from the field of view. Consider mammographic correlation if not currently undergoing routine annual   screening exams. Probable element of underlying diffuse hepatic steatosis. XR CHEST PORTABLE   Final Result   Multifocal pneumonia predominantly in the mid to lower lung zones             ------------------------- NURSING NOTES AND VITALS REVIEWED ---------------------------   The nursing notes within the ED encounter and vital signs as below have been reviewed.    /68   Pulse 81   Temp 101.1 °F (38.4 °C) (Oral)   Resp 18   Ht 5' 1\" (1.549 m)   Wt 160 lb (72.6 kg)   SpO2 96%   BMI 30.23 kg/m²   Oxygen Saturation Interpretation: Abnormal and Improved after treatment      ---------------------------------------------------PHYSICAL EXAM--------------------------------------      Constitutional/General: Alert and oriented x3, appears ill, non toxic in NAD  Head: Normocephalic and atraumatic  Eyes: EOMI  Mouth: Oropharynx clear, handling secretions, no trismus  Neck: Supple, full ROM,   Pulmonary: Lungs clear to auscultation bilaterally, no wheezes, rales, or rhonchi. Not in respiratory distress  Cardiovascular:  Regular rate and rhythm, no murmurs, gallops, or rubs. 2+ distal pulses  Abdomen: Soft, non tender, non distended,   Extremities: Moves all extremities x 4. Warm and well perfused. No leg edema. No calf tenderness. Skin: warm and dry without rash  Neurologic: GCS 15, no focal motor or sensory deficits   Psych: Normal Affect.  Behavior normal.      ------------------------------ ED COURSE/MEDICAL DECISION MAKING----------------------  Medications   amLODIPine (NORVASC) tablet 5 mg (has no administration in time range)   sodium chloride flush 0.9 % injection 5-40 mL (has no administration in time range)   sodium chloride flush 0.9 % injection 5-40 mL (has no administration in time range)   0.9 % sodium chloride infusion (has no administration in time range)   enoxaparin (LOVENOX) injection 40 mg (has no administration in time range)   ondansetron (ZOFRAN-ODT) disintegrating tablet 4 mg (has no administration in time range)     Or   ondansetron (ZOFRAN) injection 4 mg (has no administration in time range)   polyethylene glycol (GLYCOLAX) packet 17 g (has no administration in time range)   acetaminophen (TYLENOL) tablet 650 mg (has no administration in time range)     Or   acetaminophen (TYLENOL) suppository 650 mg (has no administration in time range)   potassium chloride 10 mEq/100 mL IVPB (Peripheral Line) (has no administration in time range)   magnesium sulfate 2000 mg in 50 mL IVPB premix (has no administration in time range)   0.9 % sodium chloride infusion ( IntraVENous New Bag 11/26/21 0312)   dexamethasone (DECADRON) injection 6 mg (6 mg IntraVENous Not Given 11/26/21 0305)   Vitamin D (CHOLECALCIFEROL) tablet 1,000 Units (has no administration in time range)   baricitinib Counseling: The emergency provider has spoken with the patient and discussed todays results, in addition to providing specific details for the plan of care and counseling regarding the diagnosis and prognosis. Questions are answered at this time and they are agreeable with the plan.      --------------------------------- IMPRESSION AND DISPOSITION ---------------------------------    IMPRESSION  1. Pneumonia due to COVID-19 virus    2. Acute respiratory failure with hypoxia (HCC)        DISPOSITION  Disposition: Admit to telemetry  Patient condition is stable      NOTE: This report was transcribed using voice recognition software.  Every effort was made to ensure accuracy; however, inadvertent computerized transcription errors may be present    IClarisse MD, am the primary provider of this record       Clarisse Fontanez MD  11/26/21 7357

## 2021-11-26 NOTE — ED TRIAGE NOTES
Patient BIB EMS complaining of SOB that started this morning. Patient states that she tested positive for COVID on Monday and has been getting worse since. EMS states that patient was 88% on RA upon their arrival. Patient able to speak in full sentences, in no acute distress at this time.

## 2021-11-26 NOTE — H&P
Hospital Medicine  History and Physical    Patient:  Sven Miller  MRN: [id number]    CHIEF COMPLAINT:    Chief Complaint   Patient presents with    Shortness of Breath       Primary Care Physician: Juan Carlos Briggs MD    HISTORY OF PRESENT ILLNESS:   The patient is a 55 y.o. female with a history of hypertension who was diagnosed with Covid about a week ago presented hospital due to worsening symptoms including body aches/subjective fever/cough and shortness of breath, here in the hospital patient was found to be satting in the mid to high 80s, respirations started on nasal cannula and she was given a dose of dexamethasone and will be admitted to the hospital for further evaluation management    Past Medical History:      Diagnosis Date    Acute porphyria ([address] 2006    intermittent    Endometriosis     History of ankylosing spondylitis     Hypertension        Past Surgical History:      Procedure Laterality Date    CARDIAC CATHETERIZATION  08/29/2019    Right heart catheterization. Dr. Kacy Hanson, [address] [place] [address]  2013    ENDOMETRIAL ABLATION  2009    PELVIC LAPAROSCOPY  1998    endometrosis    PELVIC LAPAROSCOPY  2005, 2012    TUNNELED VENOUS PORT PLACEMENT  2006,2008,2011       Medications Prior to Admission:    Prior to Admission medications    Medication Sig Start Date End Date Taking? Authorizing Provider   candesartan (ATACAND) 8 MG tablet Take 8 mg by mouth daily    Historical Provider, MD   Aspirin-Acetaminophen-Caffeine (EXCEDRIN PO) Take by mouth as needed    Historical Provider, MD   amLODIPine (NORVASC) 5 MG tablet Take 1 tablet by mouth daily 8/5/19   Fuad Charles MD       Allergies:   Avelox [moxifloxacin], Bactrim [sulfamethoxazole-trimethoprim], Diphenoxylate-atropine, Zithromax [azithromycin], Biaxin [clarithromycin], Clindamycin/lincomycin, Doxycycline, Penicillins, and Tetracyclines & related    Social History:   TOBACCO:   reports that she has never smoked. She has never used smokeless tobacco.  ETOH:   reports no history of alcohol use. Family History:       Problem Relation Age of Onset    Heart Failure Mother     Heart Defect Father     Hypertension Father     Heart Surgery Father     Other Sister 50        recovery addict    High Blood Pressure Brother        REVIEW OF SYSTEMS:  Ten systems reviewed and negative except for as mentioned in the HPI  Review of Systems     Physical Exam:      Vitals: /72   Pulse 83   Temp 101.1 °F (38.4 °C) (Oral)   Resp 20   Ht 5' 1\" (1.549 m)   Wt 160 lb (72.6 kg)   SpO2 96%   BMI 30.23 kg/m²     Physical Exam     General appearance: alert, cooperative and no distress. Obese  Mental Status: oriented to person, place and time and normal affect  Lungs: Bibasilar crackles with wheezing  Heart: regular rate and rhythm, no murmur  Abdomen: soft, nontender, nondistended, bowel sounds present, no masses  Extremities: no edema or redness  Skin: no gross lesions, rashes    Recent Labs     11/25/21 2055   WBC 3.4*   HGB 14.5        Recent Labs     11/25/21 2055      K 3.6      CO2 26   BUN 8   CREATININE 0.7   GLUCOSE 111*   AST 33*   ALT 26   BILITOT 0.5   ALKPHOS 78     Troponin T: No results for input(s): TROPONINI in the last 72 hours. ABGs: No results found for: PHART, PO2ART, FEG9OQS  INR: No results for input(s): INR in the last 72 hours. URINALYSIS:No results for input(s): NITRITE, COLORU, PHUR, LABCAST, WBCUA, RBCUA, MUCUS, TRICHOMONAS, YEAST, BACTERIA, CLARITYU, SPECGRAV, LEUKOCYTESUR, UROBILINOGEN, BILIRUBINUR, BLOODU, GLUCOSEU, AMORPHOUS in the last 72 hours. Invalid input(s): Inés Armas  -----------------------------------------------------------------   XR CHEST PORTABLE    Result Date: 11/25/2021  EXAMINATION: ONE XRAY VIEW OF THE CHEST 11/25/2021 8:22 pm COMPARISON: None.  HISTORY: ORDERING SYSTEM PROVIDED HISTORY: shortness of breath, COVID+ TECHNOLOGIST PROVIDED HISTORY: Reason for exam:->shortness of breath, COVID+ FINDINGS: Cardiomediastinal silhouette within normal limits. Multifocal airspace disease predominantly in the mid to lower lung zones most consistent with multifocal pneumonia. No evidence of pleural effusions. Pulmonary vasculature within normal limits. Multifocal pneumonia predominantly in the mid to lower lung zones     CTA PULMONARY W CONTRAST    Result Date: 11/25/2021  EXAMINATION: CTA OF THE CHEST 11/25/2021 11:02 pm TECHNIQUE: CTA of the chest was performed after the administration of intravenous contrast.  Multiplanar reformatted images are provided for review. MIP images are provided for review. Dose modulation, iterative reconstruction, and/or weight based adjustment of the mA/kV was utilized to reduce the radiation dose to as low as reasonably achievable. COMPARISON: Unenhanced chest CT from 08/13/2019. Portable chest from earlier this evening. HISTORY: ORDERING SYSTEM PROVIDED HISTORY: hemoptysis, sob. COVID+, eval for PE TECHNOLOGIST PROVIDED HISTORY: Reason for exam:->hemoptysis, sob. COVID+, eval for PE Decision Support Exception - unselect if not a suspected or confirmed emergency medical condition->Emergency Medical Condition (MA) FINDINGS: Pulmonary Arteries: Pulmonary arteries are adequately opacified for evaluation. No evidence of intraluminal filling defect to suggest pulmonary embolism. Main pulmonary artery is normal in caliber. Mediastinum: No evidence of mediastinal lymphadenopathy. Question minimal reactive bihilar lymphadenopathy. The heart and pericardium demonstrate no acute abnormality. There is no acute abnormality of the thoracic aorta. Lungs/pleura: No pneumothorax or pleural effusion.   Predominantly peripheral ground-glass to somewhat greater than ground-glass density infiltrates diffusely distributed bilaterally, compatible with multifocal pneumonia, most compatible with COVID pneumonia, particularly in the

## 2021-11-26 NOTE — ED NOTES
Pt 92% on RA while ambulating, . Pt 85% on RA after ambulating and sitting at bedside, short of breath, . 2L NC applied 96%.       Pat Montoya RN  11/25/21 6365

## 2021-11-26 NOTE — ED NOTES
Bed: 05  Expected date:   Expected time:   Means of arrival:   Comments:  EMS     Yaneli Cerda RN  11/25/21 4427

## 2021-11-27 LAB
ALBUMIN SERPL-MCNC: 3.1 G/DL (ref 3.5–5.2)
ALP BLD-CCNC: 66 U/L (ref 35–104)
ALT SERPL-CCNC: 23 U/L (ref 0–32)
ANION GAP SERPL CALCULATED.3IONS-SCNC: 7 MMOL/L (ref 7–16)
AST SERPL-CCNC: 23 U/L (ref 0–31)
BASOPHILS ABSOLUTE: 0 E9/L (ref 0–0.2)
BASOPHILS RELATIVE PERCENT: 0 % (ref 0–2)
BILIRUB SERPL-MCNC: 0.4 MG/DL (ref 0–1.2)
BUN BLDV-MCNC: 13 MG/DL (ref 6–20)
BURR CELLS: ABNORMAL
CALCIUM SERPL-MCNC: 8.5 MG/DL (ref 8.6–10.2)
CHLORIDE BLD-SCNC: 111 MMOL/L (ref 98–107)
CO2: 25 MMOL/L (ref 22–29)
CREAT SERPL-MCNC: 0.7 MG/DL (ref 0.5–1)
EOSINOPHILS ABSOLUTE: 0 E9/L (ref 0.05–0.5)
EOSINOPHILS RELATIVE PERCENT: 0 % (ref 0–6)
GFR AFRICAN AMERICAN: >60
GFR NON-AFRICAN AMERICAN: >60 ML/MIN/1.73
GLUCOSE BLD-MCNC: 148 MG/DL (ref 74–99)
HCT VFR BLD CALC: 38.7 % (ref 34–48)
HEMOGLOBIN: 12.6 G/DL (ref 11.5–15.5)
LYMPHOCYTES ABSOLUTE: 1.04 E9/L (ref 1.5–4)
LYMPHOCYTES RELATIVE PERCENT: 22.8 % (ref 20–42)
MCH RBC QN AUTO: 30.1 PG (ref 26–35)
MCHC RBC AUTO-ENTMCNC: 32.6 % (ref 32–34.5)
MCV RBC AUTO: 92.6 FL (ref 80–99.9)
MONOCYTES ABSOLUTE: 0.27 E9/L (ref 0.1–0.95)
MONOCYTES RELATIVE PERCENT: 6.1 % (ref 2–12)
NEUTROPHILS ABSOLUTE: 3.2 E9/L (ref 1.8–7.3)
NEUTROPHILS RELATIVE PERCENT: 71.1 % (ref 43–80)
NUCLEATED RED BLOOD CELLS: 0 /100 WBC
OVALOCYTES: ABNORMAL
PDW BLD-RTO: 12.8 FL (ref 11.5–15)
PLATELET # BLD: 166 E9/L (ref 130–450)
PMV BLD AUTO: 11 FL (ref 7–12)
POIKILOCYTES: ABNORMAL
POTASSIUM REFLEX MAGNESIUM: 3.9 MMOL/L (ref 3.5–5)
RBC # BLD: 4.18 E12/L (ref 3.5–5.5)
SODIUM BLD-SCNC: 143 MMOL/L (ref 132–146)
TOTAL PROTEIN: 5.6 G/DL (ref 6.4–8.3)
WBC # BLD: 4.5 E9/L (ref 4.5–11.5)

## 2021-11-27 PROCEDURE — 2060000000 HC ICU INTERMEDIATE R&B

## 2021-11-27 PROCEDURE — 2700000000 HC OXYGEN THERAPY PER DAY

## 2021-11-27 PROCEDURE — 2580000003 HC RX 258: Performed by: INTERNAL MEDICINE

## 2021-11-27 PROCEDURE — 99233 SBSQ HOSP IP/OBS HIGH 50: CPT | Performed by: INTERNAL MEDICINE

## 2021-11-27 PROCEDURE — 6370000000 HC RX 637 (ALT 250 FOR IP): Performed by: INTERNAL MEDICINE

## 2021-11-27 PROCEDURE — 85025 COMPLETE CBC W/AUTO DIFF WBC: CPT

## 2021-11-27 PROCEDURE — 36415 COLL VENOUS BLD VENIPUNCTURE: CPT

## 2021-11-27 PROCEDURE — 6360000002 HC RX W HCPCS: Performed by: INTERNAL MEDICINE

## 2021-11-27 PROCEDURE — 80053 COMPREHEN METABOLIC PANEL: CPT

## 2021-11-27 RX ORDER — CODEINE PHOSPHATE AND GUAIFENESIN 10; 100 MG/5ML; MG/5ML
5 SOLUTION ORAL EVERY 4 HOURS PRN
Status: DISCONTINUED | OUTPATIENT
Start: 2021-11-27 | End: 2021-11-29 | Stop reason: HOSPADM

## 2021-11-27 RX ORDER — ALPRAZOLAM 0.25 MG/1
0.5 TABLET ORAL 3 TIMES DAILY PRN
Status: DISCONTINUED | OUTPATIENT
Start: 2021-11-27 | End: 2021-11-28

## 2021-11-27 RX ADMIN — Medication 10 ML: at 09:50

## 2021-11-27 RX ADMIN — DEXAMETHASONE SODIUM PHOSPHATE 6 MG: 10 INJECTION INTRAMUSCULAR; INTRAVENOUS at 09:50

## 2021-11-27 RX ADMIN — Medication 1000 UNITS: at 09:49

## 2021-11-27 RX ADMIN — ENOXAPARIN SODIUM 40 MG: 100 INJECTION SUBCUTANEOUS at 09:49

## 2021-11-27 RX ADMIN — ALPRAZOLAM 0.5 MG: 0.25 TABLET ORAL at 21:05

## 2021-11-27 RX ADMIN — ONDANSETRON 4 MG: 2 INJECTION INTRAMUSCULAR; INTRAVENOUS at 06:21

## 2021-11-27 RX ADMIN — AMLODIPINE BESYLATE 5 MG: 5 TABLET ORAL at 09:49

## 2021-11-27 RX ADMIN — BARICITINIB 4 MG: 2 TABLET, FILM COATED ORAL at 09:49

## 2021-11-27 ASSESSMENT — PAIN SCALES - GENERAL
PAINLEVEL_OUTOF10: 0

## 2021-11-27 NOTE — PROGRESS NOTES
[facility]) Hospitalist Progress Note    Admission Date  11/25/2021  7:34 PM  Chief Complaint SOB    Subjective  History of Present Illness  Seen at bedside in her room  Not terribly SOB at rest  Becomes very symptomatic with minimal exertion   Difficulty getting to toilet   No significant CP but gets lightheaded as well  Issues w coughing fits making it hard to breathe  Issues w anxiety  Significant general weakness  No family present during my visit  No new issues identified today  Case was discussed with charge nurse    Review of Systems - 12-point review of systems has been reviewed and is otherwise negative except as listed in the HPI    Objective  Physical Exam  Vitals: /68   Pulse 66   Temp 98.7 °F (37.1 °C) (Oral)   Resp 17   Ht 5' 1\" (1.549 m)   Wt 153 lb 6.4 oz (69.6 kg)   SpO2 96%   BMI 28.98 kg/m²   General: well-developed, well-nourished, no acute distress, cooperative  Skin: warm, dry, intact, normal color without cyanosis  HEENT: normocephalic, atraumatic, mucous membranes normal, NCO2 in place  Respiratory: clear to auscultation bilaterally without respiratory distress  Cardiovascular: regular rate and rhythm without murmur / rub / gallop  Abdominal: soft, nontender, nondistended, normoactive bowel sounds  Extremities: no mottling, pulses intact, no edema  Neurologic: awake, alert, no focal deficits  Psychiatric: normal affect, cooperative    Assessment / Plan  Acute hypoxic resp failure d/t COVID pneumonia   COVID+ as of ~11/20   Imaging (CTA) shows bl multifocal infiltrates   No fevers past 24h   O2 requirement currently 2L   Incentive spirometry and prone positioning as tolerated   Inflammatory markers   o CRP 7.2  o   o Dimer 628  o Ferritin 808   Decadron 6 mg IV daily   Baricitinib day 2   Symptom mgmt for cough, fevers, nausea, diarrhea, body aches if present    Pt's PMH otherwise pertinent for HTN, porphyria, ankylosing spondylitis.   Continue outpt med regimen; if any particular issues, will address and move to active problem list above. Please see orders for further plan of care.      Code status  Full   DVT prophylaxis Lovenox   Disposition  Home when ready    Electronically signed by Sheere Treviño DO on 11/27/2021 at 5:05 PM

## 2021-11-28 LAB
ALBUMIN SERPL-MCNC: 2.8 G/DL (ref 3.5–5.2)
ALP BLD-CCNC: 56 U/L (ref 35–104)
ALT SERPL-CCNC: 23 U/L (ref 0–32)
ANION GAP SERPL CALCULATED.3IONS-SCNC: 10 MMOL/L (ref 7–16)
AST SERPL-CCNC: 21 U/L (ref 0–31)
BILIRUB SERPL-MCNC: 0.3 MG/DL (ref 0–1.2)
BUN BLDV-MCNC: 13 MG/DL (ref 6–20)
CALCIUM SERPL-MCNC: 7.4 MG/DL (ref 8.6–10.2)
CHLORIDE BLD-SCNC: 114 MMOL/L (ref 98–107)
CO2: 22 MMOL/L (ref 22–29)
CREAT SERPL-MCNC: 0.6 MG/DL (ref 0.5–1)
GFR AFRICAN AMERICAN: >60
GFR NON-AFRICAN AMERICAN: >60 ML/MIN/1.73
GLUCOSE BLD-MCNC: 109 MG/DL (ref 74–99)
HCT VFR BLD CALC: 42.6 % (ref 34–48)
HEMOGLOBIN: 13.4 G/DL (ref 11.5–15.5)
MCH RBC QN AUTO: 29.5 PG (ref 26–35)
MCHC RBC AUTO-ENTMCNC: 31.5 % (ref 32–34.5)
MCV RBC AUTO: 93.6 FL (ref 80–99.9)
PDW BLD-RTO: 12.7 FL (ref 11.5–15)
PLATELET # BLD: 202 E9/L (ref 130–450)
PMV BLD AUTO: 11.1 FL (ref 7–12)
POTASSIUM SERPL-SCNC: 3.4 MMOL/L (ref 3.5–5)
RBC # BLD: 4.55 E12/L (ref 3.5–5.5)
SODIUM BLD-SCNC: 146 MMOL/L (ref 132–146)
TOTAL PROTEIN: 4.9 G/DL (ref 6.4–8.3)
WBC # BLD: 5.6 E9/L (ref 4.5–11.5)

## 2021-11-28 PROCEDURE — 2700000000 HC OXYGEN THERAPY PER DAY

## 2021-11-28 PROCEDURE — 99233 SBSQ HOSP IP/OBS HIGH 50: CPT | Performed by: INTERNAL MEDICINE

## 2021-11-28 PROCEDURE — 36415 COLL VENOUS BLD VENIPUNCTURE: CPT

## 2021-11-28 PROCEDURE — 80053 COMPREHEN METABOLIC PANEL: CPT

## 2021-11-28 PROCEDURE — 2060000000 HC ICU INTERMEDIATE R&B

## 2021-11-28 PROCEDURE — 6360000002 HC RX W HCPCS: Performed by: INTERNAL MEDICINE

## 2021-11-28 PROCEDURE — 6370000000 HC RX 637 (ALT 250 FOR IP): Performed by: INTERNAL MEDICINE

## 2021-11-28 PROCEDURE — 2580000003 HC RX 258: Performed by: INTERNAL MEDICINE

## 2021-11-28 PROCEDURE — 85027 COMPLETE CBC AUTOMATED: CPT

## 2021-11-28 RX ADMIN — AMLODIPINE BESYLATE 5 MG: 5 TABLET ORAL at 10:26

## 2021-11-28 RX ADMIN — Medication 1000 UNITS: at 10:26

## 2021-11-28 RX ADMIN — Medication 10 ML: at 10:26

## 2021-11-28 RX ADMIN — DEXAMETHASONE SODIUM PHOSPHATE 6 MG: 10 INJECTION INTRAMUSCULAR; INTRAVENOUS at 10:25

## 2021-11-28 RX ADMIN — BARICITINIB 4 MG: 2 TABLET, FILM COATED ORAL at 10:25

## 2021-11-28 RX ADMIN — Medication 5 ML: at 23:17

## 2021-11-28 RX ADMIN — ENOXAPARIN SODIUM 40 MG: 100 INJECTION SUBCUTANEOUS at 10:25

## 2021-11-28 ASSESSMENT — PAIN SCALES - GENERAL
PAINLEVEL_OUTOF10: 0

## 2021-11-28 NOTE — PROGRESS NOTES
[facility]) Hospitalist Progress Note    Admission Date  11/25/2021  7:34 PM  Chief Complaint SOB    Subjective  History of Present Illness  Was less Lurlean Pao / George Montes last evening  Said she got worse after the Xanax  Slept well  Still spaced out this AM  Says still weak and SOB but improved  Still on 2L  Will stop Xanax, continue other tx, re-eval in AM for poss dc    Review of Systems - 12-point review of systems has been reviewed and is otherwise negative except as listed in the HPI    Objective  Physical Exam  Vitals: /78   Pulse 67   Temp 97.9 °F (36.6 °C)   Resp 12   Ht 5' 1\" (1.549 m)   Wt 154 lb 1.6 oz (69.9 kg)   SpO2 94%   BMI 29.12 kg/m²   General: well-developed, well-nourished, no acute distress, cooperative  Skin: warm, dry, intact, normal color without cyanosis  HEENT: normocephalic, atraumatic, mucous membranes normal, NCO2 in place  Respiratory: clear to auscultation bilaterally without respiratory distress  Cardiovascular: regular rate and rhythm without murmur / rub / gallop  Abdominal: soft, nontender, nondistended, normoactive bowel sounds  Extremities: no mottling, pulses intact, no edema  Neurologic: awake, alert, no focal deficits  Psychiatric: normal affect, cooperative    Assessment / Plan  Acute hypoxic resp failure d/t COVID pneumonia   COVID+ as of ~11/20   Imaging (CTA) shows bl multifocal infiltrates   No fevers past 24h   O2 requirement currently room air as of 1021 this AM   Incentive spirometry and prone positioning as tolerated   Inflammatory markers   o CRP 7.2  o   o Dimer 628  o Ferritin 808   Decadron 6 mg IV daily   Baricitinib day 3   Symptom mgmt for cough, fevers, nausea, diarrhea, body aches if present    Pt's PMH otherwise pertinent for HTN, porphyria, ankylosing spondylitis. Continue outpt med regimen; if any particular issues, will address and move to active problem list above. Please see orders for further plan of care.      Code status  Full   DVT prophylaxis Lovenox   Disposition  Home when ready - ?tomorrow if she remains stable    Electronically signed by Luigi Love DO on 11/28/2021 at 11:29 AM

## 2021-11-28 NOTE — PLAN OF CARE
Problem: Airway Clearance - Ineffective  Goal: Achieve or maintain patent airway  Outcome: Met This Shift     Problem: Gas Exchange - Impaired  Goal: Absence of hypoxia  Outcome: Met This Shift  Goal: Promote optimal lung function  Outcome: Met This Shift     Problem: Breathing Pattern - Ineffective  Goal: Ability to achieve and maintain a regular respiratory rate  Outcome: Met This Shift     Problem:  Body Temperature -  Risk of, Imbalanced  Goal: Ability to maintain a body temperature within defined limits  Outcome: Met This Shift  Goal: Will regain or maintain usual level of consciousness  Outcome: Met This Shift  Goal: Complications related to the disease process, condition or treatment will be avoided or minimized  Outcome: Met This Shift     Problem: Isolation Precautions - Risk of Spread of Infection  Goal: Prevent transmission of infection  Outcome: Met This Shift     Problem: Nutrition Deficits  Goal: Optimize nutritional status  Outcome: Met This Shift     Problem: Risk for Fluid Volume Deficit  Goal: Maintain normal heart rhythm  Outcome: Met This Shift  Goal: Maintain absence of muscle cramping  Outcome: Met This Shift  Goal: Maintain normal serum potassium, sodium, calcium, phosphorus, and pH  Outcome: Met This Shift

## 2021-11-29 VITALS
HEART RATE: 60 BPM | TEMPERATURE: 97.7 F | HEIGHT: 61 IN | RESPIRATION RATE: 16 BRPM | BODY MASS INDEX: 29.17 KG/M2 | OXYGEN SATURATION: 92 % | DIASTOLIC BLOOD PRESSURE: 81 MMHG | WEIGHT: 154.5 LBS | SYSTOLIC BLOOD PRESSURE: 130 MMHG

## 2021-11-29 LAB
ALBUMIN SERPL-MCNC: 3.4 G/DL (ref 3.5–5.2)
ALP BLD-CCNC: 67 U/L (ref 35–104)
ALT SERPL-CCNC: 60 U/L (ref 0–32)
ANION GAP SERPL CALCULATED.3IONS-SCNC: 9 MMOL/L (ref 7–16)
AST SERPL-CCNC: 55 U/L (ref 0–31)
BILIRUB SERPL-MCNC: 0.4 MG/DL (ref 0–1.2)
BUN BLDV-MCNC: 19 MG/DL (ref 6–20)
CALCIUM SERPL-MCNC: 8.8 MG/DL (ref 8.6–10.2)
CHLORIDE BLD-SCNC: 108 MMOL/L (ref 98–107)
CO2: 26 MMOL/L (ref 22–29)
CREAT SERPL-MCNC: 0.7 MG/DL (ref 0.5–1)
GFR AFRICAN AMERICAN: >60
GFR NON-AFRICAN AMERICAN: >60 ML/MIN/1.73
GLUCOSE BLD-MCNC: 110 MG/DL (ref 74–99)
HCT VFR BLD CALC: 43.3 % (ref 34–48)
HEMOGLOBIN: 14 G/DL (ref 11.5–15.5)
MCH RBC QN AUTO: 30 PG (ref 26–35)
MCHC RBC AUTO-ENTMCNC: 32.3 % (ref 32–34.5)
MCV RBC AUTO: 92.9 FL (ref 80–99.9)
PDW BLD-RTO: 12.6 FL (ref 11.5–15)
PLATELET # BLD: 207 E9/L (ref 130–450)
PMV BLD AUTO: 11 FL (ref 7–12)
POTASSIUM SERPL-SCNC: 4.1 MMOL/L (ref 3.5–5)
RBC # BLD: 4.66 E12/L (ref 3.5–5.5)
SODIUM BLD-SCNC: 143 MMOL/L (ref 132–146)
TOTAL PROTEIN: 5.9 G/DL (ref 6.4–8.3)
WBC # BLD: 5.8 E9/L (ref 4.5–11.5)

## 2021-11-29 PROCEDURE — 6360000002 HC RX W HCPCS: Performed by: INTERNAL MEDICINE

## 2021-11-29 PROCEDURE — 85027 COMPLETE CBC AUTOMATED: CPT

## 2021-11-29 PROCEDURE — 2580000003 HC RX 258: Performed by: INTERNAL MEDICINE

## 2021-11-29 PROCEDURE — 36415 COLL VENOUS BLD VENIPUNCTURE: CPT

## 2021-11-29 PROCEDURE — 99239 HOSP IP/OBS DSCHRG MGMT >30: CPT | Performed by: INTERNAL MEDICINE

## 2021-11-29 PROCEDURE — 80053 COMPREHEN METABOLIC PANEL: CPT

## 2021-11-29 PROCEDURE — 6370000000 HC RX 637 (ALT 250 FOR IP): Performed by: INTERNAL MEDICINE

## 2021-11-29 PROCEDURE — 6360000002 HC RX W HCPCS

## 2021-11-29 RX ORDER — DEXAMETHASONE 6 MG/1
6 TABLET ORAL
Qty: 8 TABLET | Refills: 0 | Status: SHIPPED | OUTPATIENT
Start: 2021-11-29 | End: 2021-12-07

## 2021-11-29 RX ORDER — DEXAMETHASONE SODIUM PHOSPHATE 4 MG/ML
INJECTION, SOLUTION INTRA-ARTICULAR; INTRALESIONAL; INTRAMUSCULAR; INTRAVENOUS; SOFT TISSUE
Status: COMPLETED
Start: 2021-11-29 | End: 2021-11-29

## 2021-11-29 RX ORDER — DEXAMETHASONE SODIUM PHOSPHATE 10 MG/ML
6 INJECTION, SOLUTION INTRAMUSCULAR; INTRAVENOUS EVERY 24 HOURS
Status: DISCONTINUED | OUTPATIENT
Start: 2021-11-29 | End: 2021-11-29 | Stop reason: HOSPADM

## 2021-11-29 RX ADMIN — ONDANSETRON 4 MG: 2 INJECTION INTRAMUSCULAR; INTRAVENOUS at 08:46

## 2021-11-29 RX ADMIN — AMLODIPINE BESYLATE 5 MG: 5 TABLET ORAL at 08:46

## 2021-11-29 RX ADMIN — Medication 1000 UNITS: at 08:46

## 2021-11-29 RX ADMIN — Medication 10 ML: at 08:47

## 2021-11-29 RX ADMIN — DEXAMETHASONE SODIUM PHOSPHATE 6 MG: 4 INJECTION, SOLUTION INTRAMUSCULAR; INTRAVENOUS at 08:48

## 2021-11-29 RX ADMIN — BARICITINIB 4 MG: 2 TABLET, FILM COATED ORAL at 08:46

## 2021-11-29 RX ADMIN — ENOXAPARIN SODIUM 40 MG: 100 INJECTION SUBCUTANEOUS at 10:24

## 2021-11-29 ASSESSMENT — PAIN SCALES - GENERAL: PAINLEVEL_OUTOF10: 0

## 2021-11-29 NOTE — DISCHARGE SUMMARY
[facility] Physician Discharge Summary     No follow-up provider specified. Activity level   As tolerated    Disposition   Home      Condition on discharge Stable    Patient ID   Jeana Jewell, 55 y. o.female /  1965  / MRN [id number]    Admit date   2021    Discharge date  2021  3:48 PM    Admission diagnoses Active Problems:    Pneumonia due to COVID-19 virus  Resolved Problems:    * No resolved hospital problems.  *    Discharge diagnoses Same    Consults   IP CONSULT TO PHARMACY  IP CONSULT TO IV TEAM    Procedures   See hospital course    Hospital Course  56F PMH HTN admitted  w acute hypoxia d/t COVID  · COVID+ as of ~  · Imaging (CTA) shows bl multifocal infiltrates  · No fevers since admission  · O2 requirement - room air as of  AM  · Incentive spirometry and prone positioning as tolerated  · Inflammatory markers - CRP 7.2 /  / Dimer 628 / Ferritin 808   · Decadron 6 mg IV daily while here, dc on PO for total of 10 days  · Baricitinib 4 days while admitted  · Symptom mgmt for cough, fevers, nausea, diarrhea, body aches if present    Discharge Exam  /81   Pulse 60   Temp 97.7 °F (36.5 °C) (Oral)   Resp 16   Ht 5' 1\" (1.549 m)   Wt 154 lb 8 oz (70.1 kg)   SpO2 92%   BMI 29.19 kg/m²   General Appearance: alert and oriented to person, place and time and in no acute distress  Skin: warm and dry  Head: normocephalic and atraumatic  Eyes: pupils equal, round, and reactive to light, extraocular eye movements intact, conjunctivae normal  Neck: neck supple and non tender without mass   Pulmonary/Chest: clear to auscultation bilaterally- no wheezes, rales or rhonchi, normal air movement, no respiratory distress  Cardiovascular: normal rate, normal S1 and S2 and no carotid bruits  Abdomen: soft, non-tender, non-distended, normal bowel sounds, no masses or organomegaly  Extremities: no cyanosis, no clubbing and no edema  Neurologic: no cranial nerve deficit and speech normal    No intake/output data recorded. No intake/output data recorded. Labs  Recent Labs     11/27/21  1003 11/28/21  1049 11/28/21  1250 11/29/21  0630    146  --  143   K 3.9 3.4*  --  4.1   * 114*  --  108*   CO2 25 22  --  26   BUN 13 13  --  19   CREATININE 0.7 0.6  --  0.7   GLUCOSE 148* 109*  --  110*   CALCIUM 8.5* 7.4*  --  8.8   WBC 4.5  --  5.6 5.8   RBC 4.18  --  4.55 4.66   HGB 12.6  --  13.4 14.0   HCT 38.7  --  42.6 43.3   MCV 92.6  --  93.6 92.9   MCH 30.1  --  29.5 30.0   MCHC 32.6  --  31.5* 32.3   RDW 12.8  --  12.7 12.6     --  202 207   MPV 11.0  --  11.1 11.0       Imaging  XR CHEST PORTABLE    Result Date: 11/25/2021  Multifocal pneumonia predominantly in the mid to lower lung zones     CTA PULMONARY W CONTRAST    Result Date: 11/25/2021  No evidence of pulmonary embolism. Bilateral multifocal infiltrates as described, compatible with multifocal pneumonia, most likely COVID pneumonia. Relatively localized lobulated right retroareolar soft tissue most likely represents normal stromal/glandular tissue, with the left retroareolar region partially excluded from the field of view. Consider mammographic correlation if not currently undergoing routine annual screening exams. Probable element of underlying diffuse hepatic steatosis.      Patient Instructions     Medication List      START taking these medications    dexamethasone 6 MG tablet  Commonly known as: DECADRON  Take 1 tablet by mouth daily (with breakfast) for 8 days        CONTINUE taking these medications    amLODIPine 5 MG tablet  Commonly known as: Norvasc  Take 1 tablet by mouth daily     candesartan 8 MG tablet  Commonly known as: ATACAND     EXCEDRIN PO           Where to Get Your Medications      These medications were sent to [address] [place] [address] [phone]  [address] [address]    Phone: [phone] · dexamethasone 6 MG tablet       Note that more than 30 minutes was spent in preparing discharge papers, discussing discharge with patient, medication review, etc.    Electronically signed by Evelyne Dance, DO on 11/29/2021 at 3:48 PM

## 2021-11-29 NOTE — PROGRESS NOTES
Patient ambulated around room with RN while monitoring pulse ox. Patient 02 on RA stayed between 92/93% while ambulating.

## 2021-11-29 NOTE — PROGRESS NOTES
Discharge instructions reviewed with patient. Discussed when to call 911, discharge meds and follow up appts. Iv removed. Tele removed too.

## 2021-11-30 ENCOUNTER — CARE COORDINATION (OUTPATIENT)
Dept: CASE MANAGEMENT | Age: 56
End: 2021-11-30

## 2021-11-30 NOTE — CARE COORDINATION
Patient contacted regarding COVID-19 diagnosis. Discussed COVID-19 related testing which was available at this time. Test results were positive. Patient informed of results, if available? Yes. Care Transition Nurse contacted the patient by telephone to perform post discharge assessment. Call within 2 business days of discharge: Yes. Verified name and  with patient as identifiers. Provided introduction to self, and explanation of the CTN/ACM role, and reason for call due to risk factors for infection and/or exposure to COVID-19. Symptoms reviewed with patient who verbalized the following symptoms: no new symptoms and no worsening symptoms. Due to no new or worsening symptoms encounter was not routed to provider for escalation. Discussed follow-up appointments. [facility] follow up appointment(s): No future appointments. Non-[facility] follow up appointment(s): Rudy Pereyra plans to call her PCP for follow, declines assistance     Non-face-to-face services provided:  Obtained and reviewed discharge summary and/or continuity of care documents     Advance Care Planning:   Does patient have an Advance Directive:  patient declined education. Educated patient about risk for severe COVID-19 due to risk factors according to CDC guidelines. CTN reviewed discharge instructions, medical action plan and red flag symptoms with the patient who verbalized understanding. Discussed COVID vaccination status: Yes. Education provided on COVID-19 vaccination as appropriate. Discussed exposure protocols and quarantine with CDC Guidelines. Patient was given an opportunity to verbalize any questions and concerns and agrees to contact CTN or health care provider for questions related to their healthcare. Reviewed and educated patient on any new and changed medications related to discharge diagnosis     Spoke with Rudy Pereyra for initial COVID monitoring call post hospital discharge. She reports that she is feeling \"better\" today.  She continues to feel tired but denies SOB with rest or activity. She stated she is resting and taking it easy. CTN encouraged deep breathing exercised, she verbalized understanding. Justus Parnell denies any needs, questions, or concerns at this time. CTN provided contact information. Plan for follow up in 7-10 days based on severity of symptoms and risk factors.

## 2021-12-07 ENCOUNTER — CARE COORDINATION (OUTPATIENT)
Dept: CASE MANAGEMENT | Age: 56
End: 2021-12-07

## 2021-12-07 NOTE — CARE COORDINATION
COVID-19 Monitoring Follow-up Note  Attempted to reach the patient for follow up [facility] (positive) Care Transition call post hospital discharge. Message left with CTN's contact information requesting return phone call.

## 2021-12-14 ENCOUNTER — CARE COORDINATION (OUTPATIENT)
Dept: CASE MANAGEMENT | Age: 56
End: 2021-12-14

## 2021-12-14 NOTE — CARE COORDINATION
COVID-19 Monitoring Final Follow-up Note  Attempted to reach the patient for final COVID Monitoring (positive) Care Transition call post hospital discharge. Message left with CTN's contact information requesting return phone call. Will sign off.